# Patient Record
Sex: MALE | Race: WHITE
[De-identification: names, ages, dates, MRNs, and addresses within clinical notes are randomized per-mention and may not be internally consistent; named-entity substitution may affect disease eponyms.]

---

## 2017-07-24 ENCOUNTER — HOSPITAL ENCOUNTER (OUTPATIENT)
Dept: HOSPITAL 18 - NAVSJIPCSP | Age: 72
End: 2017-07-24
Payer: MEDICARE

## 2017-07-24 DIAGNOSIS — R31.0: Primary | ICD-10-CM

## 2017-07-24 LAB
GLUCOSE UR STRIP-MCNC: >=1000 MG/DL
SP GR UR STRIP: 1.02 (ref 1–1.03)
WBC UR QL AUTO: (no result) HPF (ref 0–3)

## 2017-07-24 PROCEDURE — 36415 COLL VENOUS BLD VENIPUNCTURE: CPT

## 2017-07-24 PROCEDURE — 81001 URINALYSIS AUTO W/SCOPE: CPT

## 2017-07-24 PROCEDURE — 87086 URINE CULTURE/COLONY COUNT: CPT

## 2018-01-02 NOTE — RAD
TWO VIEWS CHEST:

 

Comparison: None. 

 

History: Shortness of breath since 11:00 p.m., dyspnea. 

 

FINDINGS: 

Two views of the chest shows a normal sized cardiomediastinal silhouette. The patient is status post 
CABG. Increased interstitial markings are present. There is no evidence of consolidation, mass, pleur
al effusion. Degenerative changes are seen in the spine. 

 

IMPRESSION: 

No evidence of acute cardiopulmonary disease. 

 

POS: C

## 2018-01-02 NOTE — CON
DATE OF CONSULTATION:  2018

 

CARDIOLOGY CONSULTATION

 

REASON FOR CONSULTATION:  Non-STEMI.

 

HISTORY OF PRESENT ILLNESS:  Mr. Grove is a very pleasant 72-year-old white gentleman who comes to [facility] for increased shortness of breath.  He states for last 2 days he has been unable to lay fl
at on his back.  He states he has not been able to sleep at all because he has been coughing every ti
me he lays flat.  He has tried to prop himself up with about 4 or 5 pillows with little improvement, 
so he decided to come in to the hospital for further evaluation.  Here, he was found to have an eleva
milo BNP and was given some IV Lasix, feels much better.  Because of D-dimer was elevated, a CT of the
 chest with contrast was done.  He also had troponins drawn that were elevated, so Cardiology has bee
n consulted for further care.  He has a left bundle branch block on his EKG, so this is at his baseli
ne.  He has significant history of coronary artery disease with previous bypass x5 in .  He had a
 LIMA to the LAD.  He had a nondominant right coronary artery that was not bypassed because it was sm
all.  He has a vein grafts to 4 limbs, two vein grafts are coming off of the aorta and there is Y-gra
ft to OM1, 2, 3 and 4.  One of them is a free radial from the vein graft.  This Y graft was opened an
d the distal portion of the second Y graft was closed on recent heart catheterization in .  He te
lls me that he did not have any chest pain, tightness or pressure in the last few days such as shortn
ess of breath.  When I told him he had a heart attack or the blood work was suggestive of a heart att
ack, he told me this might have happened 2 days ago as he feels that this is when everything started 
to get worse.  He had some heartburn at that time.  Currently, he is pain free, no heartburn.  His br
eathing is much better after his IV Lasix and some diuresis.

 

PAST MEDICAL HISTORY:

1.  PVD.

2.  Type 2 diabetes.

3.  Hypertension.

4.  Hyperlipidemia.

5.  Coronary artery disease.

 

PAST SURGICAL HISTORY:

1.  Coronary bypass grafting x5 in .

2.  Cholecystectomy.

3.  Right salivary gland tumor removal.

4.  Coronary stent in .

5.  Recent peripheral angiography with plans to do aortobifemoral bypass in the future if his symptom
s progress.

 

FAMILY HISTORY:  Father  at 85 of heart disease.  Mother  at 81 of Alzheimer dementia.

 

SOCIAL HISTORY:  He is a former smoker, quit smoking after his heart attack in .  He works at his
 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

 

ALLERGIES:  No known drug allergies.

 

OUTPATIENT MEDICATION:  This is from a note from July of this year and he will bring an updated list,
 they include:

1.  Carvedilol 25 mg b.i.d.

2.  Crestor 10 mg at bedtime.

3.  Plavix 75 mg a day.

4.  Lisinopril 10 mg a day.

5.  Aspirin 325 a day.

6.  Gabapentin 600 mg 3 times a day.

7.  Pramipexole.

8.  Isosorbide mononitrate 30 mg every day.

9.  Glimepiride.

10.  Nitrolingual spray.

 

REVIEW OF SYSTEMS:  A 12-point review of systems was done and is all negative unless stated in the hi
story of present illness.

 

PHYSICAL EXAMINATION:

VITAL SIGNS:  Temperature 97.2, pulse 70, respiratory rate 16, satting 98% on room air, blood pressur
e 120/62.

GENERAL:  Awake, alert, oriented x3, in no distress.

HEENT:  Normocephalic, atraumatic.

NECK:  Supple.

LUNGS:  Have mild crackles at the bilateral bases.

CARDIOVASCULAR:  S1, S2, no S3 or S4.  There is a grade 3/6 systolic ejection murmur at the right upp
er sternal border which is mid to late peaking and it radiated to the rest of the precordium.

ABDOMEN:  Soft, positive bowel sounds.

EXTREMITIES:  No edema.

SKIN:  Warm and dry.

 

LABORATORY WORK:  Reviewed.  White count of 10.9, hemoglobin of 14, hematocrit 45, and white count 13
0.  Coags with D-dimer of 1.86.  Chemistry profile unremarkable except for glucose of 203, total bili
jessica 1.5, AST of 37.  CK of 299.  CK-MB of 7.8, troponin I of 3.9.  BNP of 1057, albumin of 4.2.

 

Chest x-ray was unremarkable.

 

CT of the chest shows findings suggestive of pulmonary edema with bilateral effusions.  There is no e
vidence of pulmonary embolus, but there is an aneurysmal dilatation of the ascending aorta that measu
res 5.6 cm in AP dimension.

 

EKG was reviewed, the left bundle branch block which is old.

 

ASSESSMENT AND PLAN:

1.  Non-ST elevation myocardial infarction.

2.  Acute on chronic systolic heart failure.

3.  Ischemic cardiomyopathy.

4.  Coronary artery disease, severe.

5.  Ascending aortic aneurysm.

6.  Aortic stenosis, moderate per report.

7.  Type 2 diabetes.

8.  Peripheral vascular disease.

 

PLAN:

1.  We will get an echocardiogram to assess his aortic valve.

2.  To continue IV Lasix to diurese.

3.  I spoke with him about further risk stratification with a heart catheterization and he agrees to 
proceed.  I spoke with him at length about the risks and benefits of the procedure and he has had the
m before and he agrees to proceed.  I told him that because of his enlarged aorta, his risk of dissec
tion and perforation is higher, but this is needed before any surgical intervention is planned in the
 aorta.  We will plan on doing an angiography.  I will try to avoid stenting if at all possible as mo
st likely solution would be doing open heart surgery to replace his aortic valve and his aortic aneur
ysm and doing bypass at the same time.  If this were to be the case, he will have to be transferred o
[facility] [place] to [place] for this as we are not able to do this procedure in our facility.

4.  Continue full anticoagulation.  We will reevaluate tomorrow morning and if he is better from his 
breathing standpoint, we will plan on taking him to the catheterization lab at that time.  Currently,
 he is unable to lie flat for longer than 5 or 10 minutes.

 

Thank you for letting us to participate in the care of your patient.  We will follow.

## 2018-01-02 NOTE — CT
CT ANGIO OF CHEST PERFORMED WITH INTRAVENOUS CONTRAST ENHANCEMENT AND 3D RECONSTRUCTIONS:

 

History: Shortness of breath. 

 

FINDINGS: 

Lungs show some mild ground glass opacity with moderate bilateral pleural effusions, right larger cr
n left. This suggests an element of edema. 

 

Thoracic aorta shows aneurysmal dilatation of the ascending aorta which measures approximately 5.6 cm
. The descending thoracic aorta is within normal limits. 

 

There is good pulmonary artery opacification. Artifact degrades detailed evaluation of the subsegment
al branches in the lower lobes for evaluation for pulmonary embolus. I see no definitive signs of pul
monary embolus. 

 

Visualized liver parenchyma shows no focal abnormalities. 

 

IMPRESSION: 

1. Findings that suggest an element of edema, bilateral effusions, and some mild ground glass opacity
 to both lung fields. 

2. No CT evidence for pulmonary embolus. 

3. Aneurysmal dilatation of the ascending aorta which measures approximately 5.6 cm in AP dimension.

 

POS: [facility]

## 2018-01-02 NOTE — HP
CHIEF COMPLAINT:  Shortness of breath.

 

HISTORY OF PRESENT ILLNESS:  This is a 72-year-old pleasant gentleman with a history of coronary satnam
ry disease status post CABG, comes into the hospital with shortness of breath.  The patient says that
 he was out on the [place] in the night hunting.  He had hunted a deer and once he had process
ed the animal, he became extremely short of breath.  It did not resolve on the first and hence he cam
e to the hospital for further evaluation and treatment.  Initial evaluation showed BNP to be 1057 and
 troponin of 3.9.  Dr. Olvera was consulted.  He recommended admission of the patient for further justice
luation of the coronary arteries and elevated troponin and management of CHF.  The patient denies any
 fever, chills, nausea, vomiting, diarrhea or dysuria.  No chest pain at current moment.

 

PAST MEDICAL HISTORY:  Significant for coronary artery disease status post CABG, history of CHF, diab
etes, hypertension, and hyperlipidemia.

 

PAST SURGICAL HISTORY:  Significant for cardiac stenting x1, CABG and cholecystectomy.

 

PSYCHIATRIC HISTORY:  No previous psychiatric history.

 

SOCIAL HISTORY:  Does not drink, smoke or do recreational drugs.

 

FAMILY HISTORY:  Negative for diabetes and hypertension.

 

ALLERGIES:  No known drug allergies.

 

MEDICATIONS:  Please see MAR.

 

REVIEW OF SYSTEMS:  Significant for shortness of breath, otherwise no fever, no chills, no headache, 
no appetite, no hearing loss, no latencies.  No cough, no chest pain, no diarrhea, no dysuria, no alka
yuria, no memory or mood changes, and no neck pain.

 

PHYSICAL EXAMINATION:

VITAL SIGNS:  Blood pressure 112/63, pulse is 73, respirations 23, temperature afebrile.

GENERAL:  Patient is lying in bed, no apparent distress.

HEENT:  Atraumatic and normocephalic.  Pupils equally round, react to light.  Extraocular movements i
ntact.  Mucous membranes moist.

NECK:  No JVD.

CHEST:  Bibasilar rales heard.

HEART:  S1, S2 normal.  No murmurs or gallops.

ABDOMEN:  Soft, obese.

EXTREMITIES:  No cyanosis, clubbing, edema.  Distal pulses present.

NEUROLOGICAL:  Alert, awake, oriented.  No cranial deficits.  No sensorimotor deficits.

 

IMAGING DATA AND LABORATORY DATA:  CTA shows edema, no embolism.  Chest x-ray normal.  Also, CTA show
ed dilatation of the ascending aorta 5.6 cm.  BNP is 1057.  Chest x-ray negative.  CK-MB is 7.8, CK 2
99, potassium is 4.4, creatinine is 0.9, bilirubin 1.5.  WBC 7.9, hemoglobin is 14.

 

ASSESSMENT AND PLAN:

1.  Non-ST-segment elevation myocardial infarction.  We will trend troponins.  Echocardiogram, ACS pr
otocol.  Dr. May cruz will follow his recommendations.

2.  Congestive heart failure exacerbation.  We will do echocardiogram to classify the congestive hear
t failure.  We will treat with IV Lasix for now.  For ACS protocol includes, we will treat with subcu
 Lovenox therapeutic dose.

3.  Diabetes mellitus, put him on insulin sliding scale.

4.  Hyperlipidemia.  We will check fasting lipid profile.

5.  Hypertension appears to be stable.

6.  Obesity.  The patient has been counseled.  Sequential compression devices for deep venous thrombo
sis prophylaxis.  Patient also is on Lovenox.  The patient appears to be a FULL CODE as of now.  We w
ill work with Cardiology and further caring for the patient.

## 2018-01-03 NOTE — DIS
DATE OF ADMISSION:  01/02/2018

 

DATE OF DISCHARGE:  01/03/2018

 

DISPOSITION:  Different inpatient hospital.

 

DIAGNOSES ON DISCHARGE:  Non-ST elevation myocardial infarction, acute on chronic systolic congestive
 heart failure, ischemic cardiomyopathy, coronary artery disease, ascending aortic aneurysm, diabetes
, peripheral vascular disease, hyperlipidemia, diabetes, and hypertension.

 

CONSULTANTS:  Dr. Olvera.

 

DISCHARGE MEDICATIONS:  The same as admit medications.

 

BRIEF HOSPITAL COURSE:  A 72-year-old pleasant gentleman came into the hospital with shortness of ronald
ath after an episode of hunting, please refer to the admitting physician's H&P for further details.  
The patient was admitted, given Lovenox.  Dr. Olvera spoke to the patient in detail about his conditi
on, they decided to have further care with their cardiologist, who is at the [facility].  Arrangements for t
he transfer are being made right now, patient is medically stable to be transferred.

 

PHYSICAL EXAMINATION:

VITAL SIGNS:  At the time of discharge, his vital signs were stable.  Blood pressure was 100/60, puls
e was 70, respirations 20, and temperature afebrile.

GENERAL:  The patient was lying in bed, no apparent distress.

HEENT:  Atraumatic and normocephalic.  Pupils equally round, reactive to light.  Extraocular movement
s intact.  Mucous membranes moist.

NECK:  Supple.  No JVD.

CHEST:  Breath sounds heard.  There are no rales or rhonchi.

HEART:  S1 and S2, no murmurs or gallops.

ABDOMEN:  Soft.

EXTREMITIES:  No cyanosis, clubbing or edema.  Distal pulses present.

NEUROLOGIC:  Alert, awake, oriented.  No cranial deficits.  No sensorimotor deficits.

 

The patient right now is medically stable to be transferred.  In our hospital, he had a CTA, which sh
owed no embolism.  Troponins were elevated this hospital stay.  He is right now medically stable to b
e transferred, to be accepted by his cardiologist.  He is asked to come back to the emergency room in
 case symptoms recur.

 

Total time for this discharge took 35 minutes.

## 2018-01-03 NOTE — PDOC.CTH
Cardiology Progress Note





- Subjective





He has diuresed well and his breathing is much better. 





- Objective


 Vital Signs











  Temp Pulse Resp BP BP Pulse Ox


 


 01/03/18 07:57  98.0 F  67  18   138/66  95


 


 01/03/18 04:00  98.1 F  76  20  122/79   95








 











Admit Weight                   224 lb


 


Weight                         223 lb














 











 01/02/18 01/03/18 01/04/18





 06:59 06:59 06:59


 


Intake Total  240 


 


Balance  240 














- Physical Examination


General/Neuro: alert & oriented x3, NAD


Neck: no JVD present


Lungs: CTA, unlabored respirations


Heart: RRR


Abdomen: NT/ND


Extremities: + edema B (trace.)





- Telemetry


Telemetry Rhythm: NSR





- Labs


Result Diagrams: 


 01/02/18 05:33





 01/03/18 04:47


 Troponin/CKMB











CK-MB (CK-2)  7.8 ng/mL (0-6.6)  H*  01/02/18  05:33    


 


Troponin I  4.839 ng/mL (< 0.028)  H*  01/02/18  22:45    














- Assessment/Plan





1. Acute on chronic systolic heart failure


2. Severe dilated CM. EF at 20-25%


3. Severe aortic stenosis. 


4. Ascending aortic artery aneurysm, measured at 5.6 cm on CT


5. NSTEMI








PLAN:


- I spoke with his primary Cardiologist Julius Nayak and he tells me his coronary 

disease is severe and likely to be amenable to any intervention. 


- Most likely cause of his CHF exacerbation is worsening of his aortic valve, 

per Dr. Madrid his valve was moderate to severe with normal EF on echo on 9/ 2016.


- With reduced EF and also needing an aortic root he would require open heart 

surgery for a root repair, valve replacement and possibly bypass surgery, he 

would be high risk. I do not think he is a candidate for a TAVR with an 

enlarged root. medical therapy may be his best option and is what will do for 

now. 


- He would like to be transferred to the [facility] as there is were his 

primary Cardiologist is. if his transfer does not go through he will be treated 

medically with 48hrs of anticoagulation and will be discharged home probably 

tomorrow on a lifevest. Order will be placed today.

## 2018-09-17 ENCOUNTER — APPOINTMENT (RX ONLY)
Dept: URBAN - METROPOLITAN AREA CLINIC 91 | Facility: CLINIC | Age: 73
Setting detail: DERMATOLOGY
End: 2018-09-17

## 2018-09-17 DIAGNOSIS — Z85.828 PERSONAL HISTORY OF OTHER MALIGNANT NEOPLASM OF SKIN: ICD-10-CM

## 2018-09-17 DIAGNOSIS — D22 MELANOCYTIC NEVI: ICD-10-CM

## 2018-09-17 DIAGNOSIS — L57.0 ACTINIC KERATOSIS: ICD-10-CM

## 2018-09-17 PROBLEM — H91.90 UNSPECIFIED HEARING LOSS, UNSPECIFIED EAR: Status: ACTIVE | Noted: 2018-09-17

## 2018-09-17 PROBLEM — L85.3 XEROSIS CUTIS: Status: ACTIVE | Noted: 2018-09-17

## 2018-09-17 PROBLEM — D48.5 NEOPLASM OF UNCERTAIN BEHAVIOR OF SKIN: Status: ACTIVE | Noted: 2018-09-17

## 2018-09-17 PROBLEM — I25.10 ATHEROSCLEROTIC HEART DISEASE OF NATIVE CORONARY ARTERY WITHOUT ANGINA PECTORIS: Status: ACTIVE | Noted: 2018-09-17

## 2018-09-17 PROBLEM — I10 ESSENTIAL (PRIMARY) HYPERTENSION: Status: ACTIVE | Noted: 2018-09-17

## 2018-09-17 PROBLEM — E13.9 OTHER SPECIFIED DIABETES MELLITUS WITHOUT COMPLICATIONS: Status: ACTIVE | Noted: 2018-09-17

## 2018-09-17 PROCEDURE — 17000 DESTRUCT PREMALG LESION: CPT

## 2018-09-17 PROCEDURE — 17003 DESTRUCT PREMALG LES 2-14: CPT

## 2018-09-17 PROCEDURE — ? COUNSELING

## 2018-09-17 PROCEDURE — ? BIOPSY BY SHAVE METHOD

## 2018-09-17 PROCEDURE — 11100: CPT | Mod: 59

## 2018-09-17 PROCEDURE — ? LIQUID NITROGEN

## 2018-09-17 PROCEDURE — 99213 OFFICE O/P EST LOW 20 MIN: CPT | Mod: 25

## 2018-09-17 ASSESSMENT — LOCATION SIMPLE DESCRIPTION DERM
LOCATION SIMPLE: RIGHT CHEEK
LOCATION SIMPLE: LEFT FOREARM
LOCATION SIMPLE: RIGHT FOREARM

## 2018-09-17 ASSESSMENT — LOCATION DETAILED DESCRIPTION DERM
LOCATION DETAILED: LEFT PROXIMAL RADIAL DORSAL FOREARM
LOCATION DETAILED: RIGHT CENTRAL MALAR CHEEK
LOCATION DETAILED: RIGHT DISTAL DORSAL FOREARM

## 2018-09-17 ASSESSMENT — PAIN INTENSITY VAS: HOW INTENSE IS YOUR PAIN 0 BEING NO PAIN, 10 BEING THE MOST SEVERE PAIN POSSIBLE?: NO PAIN

## 2018-09-17 ASSESSMENT — LOCATION ZONE DERM
LOCATION ZONE: ARM
LOCATION ZONE: FACE

## 2018-09-17 NOTE — PROCEDURE: BIOPSY BY SHAVE METHOD
Consent: Verbal consent was obtained and risks were reviewed including but not limited to scarring, infection, bleeding, scabbing, incomplete removal, nerve damage and allergy to anesthesia.
Bill 97419 For Specimen Handling/Conveyance To Laboratory?: no
Detail Level: Detailed
Wound Care: Vaseline
Was A Bandage Applied: Yes
Size Of Lesion In Cm: 0
Cryotherapy Text: The wound bed was treated with cryotherapy after the biopsy was performed.
Type Of Destruction Used: Curettage
Biopsy Method: Dermablade
Lab: [id number]
Silver Nitrate Text: The wound bed was treated with silver nitrate after the biopsy was performed.
Hemostasis: Robert's
Biopsy Type: H and E
Notification Instructions: Patient will be notified of biopsy results. However, patient instructed to call the office if not contacted within 2 weeks.
Curettage Text: The wound bed was treated with curettage after the biopsy was performed.
Electrodesiccation And Curettage Text: The wound bed was treated with electrodesiccation and curettage after the biopsy was performed.
Post-Care Instructions: I reviewed with the patient in detail post-care instructions. Patient is to keep the biopsy site dry overnight, and then apply vaseline twice daily until healed. Patient may apply hydrogen peroxide soaks to remove any crusting.
Depth Of Biopsy: dermis
Electrodesiccation Text: The wound bed was treated with electrodesiccation after the biopsy was performed.
Dressing: bandage
Lab Facility: [id number]
Anesthesia Type: 1% lidocaine without epinephrine
Billing Type: Third-Party Bill
Anesthesia Volume In Cc (Will Not Render If 0): 0.3

## 2018-09-17 NOTE — PROCEDURE: LIQUID NITROGEN
Post-Care Instructions: I reviewed with the patient in detail post-care instructions. Patient is to wear sunprotection, and avoid picking at any of the treated lesions. Pt may apply Vaseline to crusted or scabbing areas. If any treated lesion does not resolve patient is asked to return for further treatment and/or biopsy.
Detail Level: Simple
Number Of Freeze-Thaw Cycles: 1 freeze-thaw cycle
Duration Of Freeze Thaw-Cycle (Seconds): 5
Render Post-Care Instructions In Note?: no
Consent: The patient's verbal consent was obtained including but not limited to risks of crusting, scabbing, blistering, scarring, darker or lighter pigmentary change, recurrence, incomplete removal and infection.

## 2020-02-17 ENCOUNTER — HOSPITAL ENCOUNTER (OUTPATIENT)
Dept: HOSPITAL 18 - NAV RAD | Age: 75
Discharge: HOME | End: 2020-02-17
Attending: INTERNAL MEDICINE
Payer: MEDICARE

## 2020-02-17 DIAGNOSIS — M19.011: ICD-10-CM

## 2020-02-17 DIAGNOSIS — S40.011A: Primary | ICD-10-CM

## 2020-02-17 NOTE — RAD
Right shoulder 3 views



HISTORY: Right shoulder pain.



FINDINGS: Acromioclavicular and glenohumeral alignment are maintained. Mild osteophytosis. Downslopin
g of the acromion. No acute fracture, dislocation, or aggressive osseous erosions.











IMPRESSION: Mild osteoarthritic changes right shoulder.



Reported By: TAMMY Rubio 

Electronically Signed:  2/17/2020 4:26 PM

## 2020-05-27 ENCOUNTER — HOSPITAL ENCOUNTER (EMERGENCY)
Dept: HOSPITAL 18 - NAV ERS | Age: 75
Discharge: HOME | End: 2020-05-27
Payer: MEDICARE

## 2020-05-27 DIAGNOSIS — Z79.899: ICD-10-CM
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
## 2020-05-27 DIAGNOSIS — E78.5: ICD-10-CM

## 2020-05-27 DIAGNOSIS — I11.0: ICD-10-CM

## 2020-05-27 DIAGNOSIS — E78.00: ICD-10-CM

## 2020-05-27 DIAGNOSIS — Z79.82: ICD-10-CM

## 2020-05-27 DIAGNOSIS — I50.9: ICD-10-CM

## 2020-05-27 DIAGNOSIS — S83.401A: Primary | ICD-10-CM

## 2020-05-27 NOTE — RAD
Exam:4 views right knee



HISTORY: Pain.



COMPARISON: None



FINDINGS: Moderate atherosclerosis. No fracture, cortical irregularity or periosteal reaction. Preser
william joint spaces. No joint effusion.



IMPRESSION: No acute abnormality.



Reported By: Christine Thompson 

Electronically Signed:  5/27/2020 2:12 PM

## 2020-10-05 ENCOUNTER — HOSPITAL ENCOUNTER (INPATIENT)
Dept: HOSPITAL 92 - ERS | Age: 75
LOS: 4 days | Discharge: HOME | DRG: 253 | End: 2020-10-09
Attending: INTERNAL MEDICINE | Admitting: INTERNAL MEDICINE
Payer: MEDICARE

## 2020-10-05 VITALS — BODY MASS INDEX: 26.6 KG/M2

## 2020-10-05 DIAGNOSIS — L03.116: ICD-10-CM

## 2020-10-05 DIAGNOSIS — E11.42: ICD-10-CM

## 2020-10-05 DIAGNOSIS — I11.0: ICD-10-CM

## 2020-10-05 DIAGNOSIS — E78.00: ICD-10-CM

## 2020-10-05 DIAGNOSIS — I77.1: ICD-10-CM

## 2020-10-05 DIAGNOSIS — Z95.1: ICD-10-CM

## 2020-10-05 DIAGNOSIS — E11.52: Primary | ICD-10-CM

## 2020-10-05 DIAGNOSIS — I50.9: ICD-10-CM

## 2020-10-05 DIAGNOSIS — L97.429: ICD-10-CM

## 2020-10-05 DIAGNOSIS — Z95.4: ICD-10-CM

## 2020-10-05 DIAGNOSIS — Z79.899: ICD-10-CM

## 2020-10-05 DIAGNOSIS — Z20.828: ICD-10-CM

## 2020-10-05 DIAGNOSIS — I25.10: ICD-10-CM

## 2020-10-05 DIAGNOSIS — Z79.82: ICD-10-CM

## 2020-10-05 DIAGNOSIS — Z90.49: ICD-10-CM

## 2020-10-05 DIAGNOSIS — N40.0: ICD-10-CM

## 2020-10-05 DIAGNOSIS — I73.9: ICD-10-CM

## 2020-10-05 DIAGNOSIS — G25.81: ICD-10-CM

## 2020-10-05 PROCEDURE — 86901 BLOOD TYPING SEROLOGIC RH(D): CPT

## 2020-10-05 PROCEDURE — A9579 GAD-BASE MR CONTRAST NOS,1ML: HCPCS

## 2020-10-05 PROCEDURE — 74185 MRA ABD W OR W/O CNTRST: CPT

## 2020-10-05 PROCEDURE — 83605 ASSAY OF LACTIC ACID: CPT

## 2020-10-05 PROCEDURE — 80202 ASSAY OF VANCOMYCIN: CPT

## 2020-10-05 PROCEDURE — 83735 ASSAY OF MAGNESIUM: CPT

## 2020-10-05 PROCEDURE — 86900 BLOOD TYPING SEROLOGIC ABO: CPT

## 2020-10-05 PROCEDURE — 86850 RBC ANTIBODY SCREEN: CPT

## 2020-10-05 PROCEDURE — 87635 SARS-COV-2 COVID-19 AMP PRB: CPT

## 2020-10-05 PROCEDURE — 86769 SARS-COV-2 COVID-19 ANTIBODY: CPT

## 2020-10-05 PROCEDURE — 75635 CT ANGIO ABDOMINAL ARTERIES: CPT

## 2020-10-05 PROCEDURE — 36416 COLLJ CAPILLARY BLOOD SPEC: CPT

## 2020-10-05 PROCEDURE — 93923 UPR/LXTR ART STDY 3+ LVLS: CPT

## 2020-10-05 PROCEDURE — C8900 MRA W/CONT, ABD: HCPCS

## 2020-10-05 PROCEDURE — 36415 COLL VENOUS BLD VENIPUNCTURE: CPT

## 2020-10-05 PROCEDURE — C8912 MRA W/CONT, LWR EXT: HCPCS

## 2020-10-05 PROCEDURE — U0003 INFECTIOUS AGENT DETECTION BY NUCLEIC ACID (DNA OR RNA); SEVERE ACUTE RESPIRATORY SYNDROME CORONAVIRUS 2 (SARS-COV-2) (CORONAVIRUS DISEASE [COVID-19]), AMPLIFIED PROBE TECHNIQUE, MAKING USE OF HIGH THROUGHPUT TECHNOLOGIES AS DESCRIBED BY CMS-2020-01-R: HCPCS

## 2020-10-05 PROCEDURE — 83880 ASSAY OF NATRIURETIC PEPTIDE: CPT

## 2020-10-05 PROCEDURE — 85025 COMPLETE CBC W/AUTO DIFF WBC: CPT

## 2020-10-05 PROCEDURE — 80048 BASIC METABOLIC PNL TOTAL CA: CPT

## 2020-10-05 RX ADMIN — VANCOMYCIN SCH MLS: 1.5 INJECTION, SOLUTION INTRAVENOUS at 20:55

## 2020-10-05 NOTE — HP
REASON FOR ADMISSION:  Left foot and leg cellulitis, nonhealing ulcer.



HISTORY OF PRESENTING ILLNESS:  The patient gives history of having a spider 
bite

and he specifically mentions that it was brown recluse on .  It

apparently bit him three times on the same day.  This happened on the medial 
aspect

of his left leg.  The area got red and black and he had initial bout of 
cellulitis,

which is more in the leg area.  He took two weeks of antibiotics then and went 
back

to see his primary care physician.  After about four weeks or so, the redness 
moved

to the ankle area and he developed a blister over the plantar aspect of left 
great

toe and the heel area.  He went back to his primary care physician again and got

ciprofloxacin and doxycycline.  The patient says this has been helping him, but 
his

primary care physician was getting worried as he has required nearly three 
rounds of

antibiotics and his wounds are not healing and asked him to get hospitalized for

further workup.  He had arterial Doppler done, which showed high velocities in 
both

lower extremities.  Currently, has no complaints of any pain.  He is ambulating 
on

his legs.  No complaints of fever as of now.  The patient does not have any 
COVID-19

symptoms. 



PAST MEDICAL AND SURGICAL HISTORY:  History of CABG for five-vessel disease done
by

Dr. Osman.  He has had prior stents done.  History of CHF/cardiomyopathy, aortic

valve replacement, which is bovine, diabetes mellitus type 2, hypertension,

dyslipidemia, benign prostatic hypertrophy, restless legs syndrome, peripheral

neuropathy, and cholecystectomy.  Echo done in 2018 shows ejection

fraction of 20% to 25%. 



CURRENT MEDICATIONS:  

1. The patient is on pramipexole 0.25 mg p.o. twice daily.

2. Gabapentin 600 mg daily and 1200 mg p.o. nightly.

3. Finasteride 5 mg p.o. daily.

4. Entresto 24/26 mg one tablet twice daily.

5. Glimepiride 2 mg twice daily.

6. Lasix 20 mg daily.

7. Plavix 75 mg daily.

8. Rosuvastatin 10 mg p.o. every evening.

9. Vitamin C 1000 mg p.o. daily.

10. Zinc sulfate 220 mg p.o. daily.



ALLERGIES:  NO KNOWN DRUG ALLERGIES.



PERSONAL HISTORY:  Drinks one beer around 4 p.m.  Otherwise, does not abuse 
heavy

alcohol or drugs.  Does not smoke now.  He stays alone.  His wife  of COVID-
19

on .  He has always tested negative for it. 



FAMILY HISTORY:  Mother  of dementia and its complications at the age of 75.

Father  of natural causes at the age of 86. 



CODE STATUS:  Full.  Power of  is his son, Mr. Trace Hastings.



REVIEW OF SYSTEMS:  CONSTITUTIONAL:  Negative for weight loss or gain, ability 
to

conduct usual activities. 

SKIN:  Negative for rash, itching. 

EYES:  Negative for double vision, pain. 

ENT/MOUTH:  Negative for nose bleeding, neck stiffness, pain, tenderness. 

CARDIOVASCULAR:  Negative for palpitations, dyspnea on exertion, orthopnea. 

RESPIRATORY:  Negative for shortness of breath, wheezing, cough, hemoptysis, 
fever

or night sweats. 

GASTROINTESTINAL:  Negative for poor appetite, abdominal pain, heartburn, 
nausea,

vomiting, constipation, or diarrhea. 

GENITOURINARY:  Negative for urgency, frequency, dysuria, nocturia. 

MUSCULOSKELETAL:  Negative for pain, swelling. 

NEUROLOGIC/PSYCHIATRIC:  Negative for anxiety, depression. 

ALLERGY/IMMUNOLOGIC:  Negative for skin rash, bleeding tendency.



PHYSICAL EXAMINATION:

GENERAL:  The patient is a 75-year-old male, who is currently not in any acute

distress. 

VITAL SIGNS:  Blood pressure 146/66, pulse rate 56 per minute, respiratory rate 
16

per minute, temperature 98.5 degrees Fahrenheit, and saturating 96% on room air.


NECK:  Supple.  No elevated JVD. 

HEENT:  Eyes, extraocular muscles are intact.  Pupils are reacting to light.  
Oral

cavity, mucous membranes are moist.  No exudates or congestion. 

CARDIOVASCULAR SYSTEM:  S1 and S2 heard.  Regular rhythm. 

RESPIRATORY SYSTEM:  Air entry 1+ bilateral.  No rales or rhonchi. 

ABDOMEN:  Soft.  Bowel sounds heard.  No tenderness, rigidity, or guarding. 

EXTREMITIES:  Left foot, ankle, and lower 1/3 of his leg is erythematous.  He 
has

old healing ulcer over the plantar aspect of distal phalanx of left great toe.  
He

also has a healing ulcer over the left heel.  Peripheral pulses are 1+ 
bilateral. 

CENTRAL NERVOUS SYSTEM:  No gross focal motor deficits noted.  The patient is 
alert,

awake, and oriented well. 

PSYCHIATRIC SYSTEM:  The patient's mood is euthymic.  No hallucinations or 
delusions.



LABORATORY DATA:  White count of 7, H and H 15 and 50, platelet count 131, MCV 
is 98

with 73% neutrophils.  Electrolytes stable.  BUN 22, creatinine 0.8, serum 
glucose

153.  Ultrasound arterial Doppler on both lower extremities done showed 
extensive

atherosclerotic disease throughout all major arteries of bilateral lower

extremities.  There is a high-grade arterial occlusive disease throughout 
bilateral

lower extremities.  Left tibia and fibula, two view x-ray done showed no 
fracture or

dislocation.  Scattered soft tissue swelling over the pretibial area. 



CLINICAL IMPRESSION AND PLAN:  The patient will be admitted to medical floor for

left lower extremity cellulitis with nonhealing ulcers and likely peripheral

vascular disease.  He has had a prior abdominal aortogram with runoff done by 
Dr. Osman in 2018.  We will consult Dr. Osman for Vascular Surgery and will place 
him on

vancomycin and ceftriaxone for now.  We will continue Plavix, glimepiride, 
Proscar,

Crestor, Mirapex, Entresto at home doses.  COVID-19 PCR for completion be 
obtained.

We will continue to closely monitor him on medical floor. 







Job ID:  [id number]



[facility]

## 2020-10-05 NOTE — ULT
ULTRASOUND DOPPLER DUPLEX ARTERIA BILATERAL:



DATE:

10/5/2020



HISTORY:

75-year-old male with bilateral lower extremity decreased pulses



TECHNIQUE:

Grayscale, color-flow, and spectral analysis, of major arteries of bilateral lower extremities.



FINDINGS:



Atherosclerosis:

Calcified plaque visualized throughout all arteries.



Highest peak systolic velocity in cm/s, followed by pulsed Doppler waveform:



RIGHT:



Common femoral: 95; Biphasic

Profunda femoral: 60; Biphasic

Superficial femoral, proximal: 30; Biphasic

Superficial femoral, mid: 15; Monophasic

Superficial femoral, distal: Flow not visualized

Popliteal: 15; Monophasic

Posterior tibial: 20; Monophasic

Anterior tibial: 5; Monophasic

Dorsalis pedis: 5; Monophasic



LEFT:



Common femoral: 85; Biphasic

Profunda femoral: 10; Monophasic

Superficial femoral, proximal: 10; Monophasic

Superficial femoral, mid: 10; Monophasic

Superficial femoral, distal: 10; Monophasic

Popliteal: 10; Monophasic

Posterior tibial: Flow not visualized

Anterior tibial: 20; Monophasic

Dorsalis pedis: Flow not visualized



IMPRESSION:

1 extensive atherosclerotic disease throughout all major arteries of bilateral lower extremities.

2. High-grade arterial occlusive disease throughout bilateral lower extremities.



Reported By: Ren Starr 

Electronically Signed:  10/5/2020 2:50 PM

## 2020-10-05 NOTE — CT
HISTORY: Peripheral vascular disease



COMPARISON: 7/27/2017



TECHNIQUE: Multiple contiguous axial images were obtained a CTA of the abdomen, pelvis, and bilateral
 lower extremities with contrast. Sagittal and coronal 3-D MIP reformats were performed.



FINDINGS:

Liver: Diffuse hypoattenuation suggesting hepatic steatosis.

Gallbladder: Surgically absent.

Kidneys: Symmetric enhancement. No evidence of obstructive uropathy.

Adrenal glands: Unremarkable.

Spleen: Unremarkable.

Pancreas: Unremarkable.



Bowel: Limited evaluation by the lack of oral contrast. No evidence of a bowel obstruction. Normal il
eocecal junction. Normal caliber appendix. Diverticulosis. Colitis..

Reproductive organs :Enlarged prostate gland. There are calcifications present.

Retroperitoneum: No lymphadenopathy

Bones: Multilevel vacuum disc phenomenon. Multilevel degenerative changes.

Inferior thorax: Chronic changes.

Heart: Normal heart size. There is a stent at the proximal ascending thoracic aorta.



Abdominal aorta. Normal caliber without evidence of dissection or aneurysmal dilatation.

Celiac trunk: Patent

SMA: Patent

MELINDA: Patent

Renal arteries: Left single renal arteries and 2 right renal arteries without significant atheroscler
otic disease



Bilateral common iliac arteries: Unremarkable.

Internal iliac arteries: Atherosclerosis involving proximal bilateral internal iliac arteries..

External iliac arteries: Unremarkable.



Common femoral arteries: Unremarkable.

Profunda femoral arteries: Unremarkable.

Superficial femoral arteries: Atherosclerosis with occlusion of the proximal right superficial femora
l artery, mid and distal superficial femoral artery. Atherosclerosis with occlusion of the mid and

distal left superficial femoral artery..



Popliteal arteries: Atherosclerosis and occlusion of bilateral popliteal arteries..

Right lower extremity: Suboptimal evaluation due to poor timing of contrast bolus. There is evidence 
of atherosclerosis.

Left lower extremity: Suboptimal evaluation due to poor timing of contrast bolus. There is extensive 
atherosclerotic



IMPRESSION:



1. Suboptimal evaluation of the left and right lower extremity arterial system due to poor timing of 
contrast bolus.

2. There does appear to be significant atherosclerosis and occlusion involving bilateral superficial 
femoral arteries and popliteal arteries. Evaluation may in part be limited due to poor timing of

contrast bolus. Consider conventional angiography.

3. Additional findings as above



Transcribed Date/Time: 10/5/2020 8:46 PM



Reported By: Christine Thompson 

Electronically Signed:  10/5/2020 8:50 PM

## 2020-10-06 LAB
ANION GAP SERPL CALC-SCNC: 11 MMOL/L (ref 10–20)
BASOPHILS # BLD AUTO: 0 THOU/UL (ref 0–0.2)
BASOPHILS NFR BLD AUTO: 0.4 % (ref 0–1)
BUN SERPL-MCNC: 19 MG/DL (ref 8.4–25.7)
CALCIUM SERPL-MCNC: 8.8 MG/DL (ref 7.8–10.44)
CHLORIDE SERPL-SCNC: 104 MMOL/L (ref 98–107)
CO2 SERPL-SCNC: 25 MMOL/L (ref 23–31)
CREAT CL PREDICTED SERPL C-G-VRATE: 113 ML/MIN (ref 70–130)
EOSINOPHIL # BLD AUTO: 0.2 THOU/UL (ref 0–0.7)
EOSINOPHIL NFR BLD AUTO: 2.6 % (ref 0–10)
GLUCOSE SERPL-MCNC: 80 MG/DL (ref 83–110)
HGB BLD-MCNC: 16 G/DL (ref 14–18)
LYMPHOCYTES # BLD: 1.6 THOU/UL (ref 1.2–3.4)
LYMPHOCYTES NFR BLD AUTO: 27.5 % (ref 21–51)
MCH RBC QN AUTO: 32.6 PG (ref 27–31)
MCV RBC AUTO: 98.6 FL (ref 78–98)
MONOCYTES # BLD AUTO: 0.7 THOU/UL (ref 0.11–0.59)
MONOCYTES NFR BLD AUTO: 11.5 % (ref 0–10)
NEUTROPHILS # BLD AUTO: 3.4 THOU/UL (ref 1.4–6.5)
NEUTROPHILS NFR BLD AUTO: 58 % (ref 42–75)
PLATELET # BLD AUTO: 122 THOU/UL (ref 130–400)
POTASSIUM SERPL-SCNC: 4.2 MMOL/L (ref 3.5–5.1)
RBC # BLD AUTO: 4.89 MILL/UL (ref 4.7–6.1)
SODIUM SERPL-SCNC: 136 MMOL/L (ref 136–145)
WBC # BLD AUTO: 5.8 THOU/UL (ref 4.8–10.8)

## 2020-10-06 RX ADMIN — VANCOMYCIN SCH MLS: 1.5 INJECTION, SOLUTION INTRAVENOUS at 20:42

## 2020-10-06 RX ADMIN — INSULIN LISPRO PRN UNIT: 100 INJECTION, SOLUTION INTRAVENOUS; SUBCUTANEOUS at 18:21

## 2020-10-06 RX ADMIN — INSULIN LISPRO PRN UNIT: 100 INJECTION, SOLUTION INTRAVENOUS; SUBCUTANEOUS at 11:27

## 2020-10-06 RX ADMIN — CEFTRIAXONE SCH MLS: 2 INJECTION, POWDER, FOR SOLUTION INTRAMUSCULAR; INTRAVENOUS at 11:18

## 2020-10-06 RX ADMIN — VANCOMYCIN SCH MLS: 1.5 INJECTION, SOLUTION INTRAVENOUS at 08:18

## 2020-10-06 NOTE — PDOC.HOSPP
- Subjective


Encounter Date: 10/06/20


Encounter Time: 09:00


Subjective: 


no pain in his leg


feels better, no sob





- Objective


Vital Signs & Weight: 


                             Vital Signs (12 hours)











  Temp Pulse Resp BP Pulse Ox


 


 10/06/20 07:24  98.0 F  62  20  137/70  94 L








                                     Weight











Weight                         213 lb














I&O: 


                                        











 10/05/20 10/06/20 10/07/20





 06:59 06:59 06:59


 


Intake Total  240 


 


Balance  240 











Result Diagrams: 


                                 10/06/20 05:27





                                 10/06/20 05:27


Additional Labs: 


                                   Accuchecks











  10/06/20





  11:12


 


POC Glucose  152 H














Hospitalist ROS





- Medication


Medications: 


Active Medications











Generic Name Dose Route Start Last Admin





  Trade Name Freq  PRN Reason Stop Dose Admin


 


Enoxaparin Sodium  40 mg  10/06/20 09:00  10/06/20 09:39





  Enoxaparin Sodium 40 Mg/0.4 Ml Syringe  SC   Not Given





  0900 CHON  


 


Famotidine  20 mg  10/05/20 21:00  10/06/20 08:19





  Famotidine 20 Mg Tab  PO   20 mg





  BID CHON   Administration


 


Finasteride  5 mg  10/06/20 09:00  10/06/20 08:19





  Finasteride 5 Mg Tab  PO   5 mg





  DAILY CHON   Administration


 


Gabapentin  600 mg  10/06/20 09:00  10/06/20 08:19





  Gabapentin 300 Mg Cap  PO   600 mg





  DAILY CHON   Administration


 


Gabapentin  1,200 mg  10/05/20 21:00  10/05/20 21:01





  Gabapentin 400 Mg Cap  PO   Not Given





  HS CHON  


 


Glimepiride  2 mg  10/05/20 16:30  10/06/20 08:18





  Glimepiride 2 Mg Tab  PO   2 mg





  BID-AC CHON   Administration


 


Vancomycin HCl 1.5 gm/ Device  300 mls @ 200 mls/hr  10/05/20 20:00  10/06/20 

08:18





  IVPB   300 mls





  0800,2000 CHON   Administration


 


Ceftriaxone Sodium 2 gm/  100 mls @ 200 mls/hr  10/06/20 10:00  10/06/20 11:18





  Sodium Chloride  IVPB   100 mls





  1000 CHON   Administration


 


Insulin Human Lispro  0 units  10/05/20 16:16  10/06/20 11:27





  Humalog 300 Units/3 Ml Vial  SC   2 unit





  .MODERATE SLIDING SC PRN   Administration





  Moderate Correctional Scale  


 


Pramipexole Dihydrochloride  0.25 mg  10/05/20 21:00  10/06/20 08:20





  Pramipexole Di-Hcl 0.25 Mg Tab  PO   0.25 mg





  BID CHON   Administration


 


Rosuvastatin Calcium  10 mg  10/05/20 21:00  10/05/20 21:01





  Rosuvastatin 10 Mg Tab  PO   Not Given





  QPM CHON  


 


Sacubitril/Valsartan  1 tab  10/05/20 21:00  10/06/20 08:20





  Sacubitril 24mg/Valsartan 26mg Tab  PO   1 tab





  BID CHON   Administration














- Exam


General Appearance: awake alert


Eye: PERRL, anicteric sclera


ENT: no oropharyngeal lesions, moist mucosa


Neck: supple, no JVD


Heart: RRR, no murmur


Respiratory: no wheezes, no rales


Gastrointestinal: soft, non-tender, non-distended, normal bowel sounds


Extremities: no edema


Extremities - other findings: left leg erythema+


Neurological: cranial nerve grossly intact, no focal deficits


Psychiatric: normal affect, A&O x 3





Hosp A/P


(1) Left leg cellulitis


Code(s): L03.116 - CELLULITIS OF LEFT LOWER LIMB   Status: Acute   





(2) PVD (peripheral vascular disease)


Code(s): I73.9 - PERIPHERAL VASCULAR DISEASE, UNSPECIFIED   Status: Acute   





(3) DM type 2 (diabetes mellitus, type 2)


Status: Chronic   


Qualifiers: 


   Diabetes mellitus long term insulin use: without long term use 





(4) Dyslipidemia


Code(s): E78.5 - HYPERLIPIDEMIA, UNSPECIFIED   Status: Chronic   





(5) Coronary artery disease


Code(s): I25.10 - ATHSCL HEART DISEASE OF NATIVE CORONARY ARTERY W/O ANG PCTRS  

Status: Chronic   


Qualifiers: 


   Coronary Disease-Associated Artery/Lesion type: bypass graft   Native vs. 

transplanted heart: native heart   Associated angina: without angina   Qualified

Code(s): I25.810 - Atherosclerosis of coronary artery bypass graft(s) without 

angina pectoris   





(6) Hypertension


Code(s): I10 - ESSENTIAL (PRIMARY) HYPERTENSION   Status: Chronic   


Qualifiers: 


   Hypertension type: essential hypertension   Qualified Code(s): I10 - 

Essential (primary) hypertension   





- Plan





is on vanc and ceftriaxone


appreciate help from , may need vascular procedure


continue home meds as above


MRA and CT aorta with run off results noted


hemostable

## 2020-10-06 NOTE — MRI
MRA OF THE LOWER EXTREMITIES WITH IV CONTRAST:

 

INDICATION: 

Define patency of the lower extremity vessels after incomplete CTA.

 

TECHNIQUE:  

There are whole body MRA images obtained of the abdomen and pelvis with bilateral lower extremity run
off.  20 cc of MultiHance was utilized for the examination.

 

FINDINGS: 

There are 2 separate right renal arteries.  There is a single left renal artery.  All renal arteries 
appear patent.  There is moderate narrowing involving the origin of the proximal aspect of the SMA.  
Celiac appears patent.  There is some moderate narrowing involving the origin of the MELINDA.  Both commo
n iliac arteries are patent.  There is patency of both external iliac arteries as some prominent athe
rosclerotic irregularity of the common femoral arteries.

 

There is complete occlusion of the superficial femoral arteries.  There is some reconstitution of gabe
w seen within the mid to distal left SFA through collaterals but with development of another area of 
complete occlusion just proximal to the left adductor hiatus.  There is reconstitution of flow within
 both lower extremities through the profunda femoral collateral vessels into the popliteal arteries w
ith opacification of the trifurcation within both lower extremities.  There is flow below the posteri
or tibial artery through the level of the plantar arch and into the distal foot.  The peroneal artery
 extends to the level of the ankle bilaterally.  The anterior tibial artery extends to the level of t
he ankle on the right lower extremity and on the left lower extremity.

 

IMPRESSION: 

1.  Complete occlusion of the superficial femoral arteries bilaterally with reconstituted flow throug
h collaterals via the deep profunda femoral artery at the level of the popliteal artery bilaterally. 
 There is at least 3-vessel runoff to both lower extremities to the level of the ankle bilaterally.

 

2.  Moderate narrowing involving the origin of the proximal aspect of the superior mesenteric artery.


 

POS: [facility]

## 2020-10-06 NOTE — PRG
DATE OF SERVICE:  10/06/2020



The patient's CT angiogram was performed and reviewed.  He does have some occlusion

of the left superficial femoral artery as well as the right.  His left profunda has

a significant stenosis at its origin.  Unfortunately, no contrast shows up in his

distal vessels and whether this was due to timing of contrast or lack of blood flow

in his distal vessels is not clear.  I think I will go ahead and order an MRA to

better characterize the flow in his lower extremities.  If there is no distal bypass

to be done, consideration could be given to a common and profunda femoral

endarterectomy and if distal vessels are visible, consider the above procedure

combined with a femoral popliteal bypass using [place]-Fernando.  In any event, the patient

is stable and will continue evaluation. 







Job ID:  [id number]

## 2020-10-06 NOTE — MRI
MRA OF THE ABDOMEN WITH IV CONTRAST:

 

INDICATION: 

Atherosclerotic disease of the abdominal aorta with a history of ischemic ulcers to the left foot.

 

COMPARISON: 

Prior CTA of the abdomen and pelvis with bilateral extremity runoff dated 10/5/2020.

 

CONTRAST:

20 cc of MultiHance.

 

FINDINGS: 

There is moderate narrowing involving the origin and proximal aspect of the SMA.  There is patency of
 the celiac and duplicated right renal arteries.  There is patency of the left renal artery with just
 minimal narrowing involving the proximal left renal artery.  There is moderate narrowing involving t
he origin of the MELINDA.  There is prominent atherosclerotic irregularity involving the iliac vasculatur
e.

 

IMPRESSION: 

1.  No aneurysmal dilatation of the aortic arch.

 

2.  Moderate narrowing involving the origin of the proximal aspect of the superior mesenteric artery.


 

3.  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
eft main renal artery.

 

4.  Moderate narrowing involving the origin of the inferior mesenteric artery.

 

POS: [facility]

## 2020-10-06 NOTE — PRG
DATE OF SERVICE:  



CT scan was reviewed this morning and did not show enough contrast getting down the

left lower leg to determine whether revascularization was possible.  I did an MRA of

his lower extremities and indeed his popliteal artery and tibioperoneal vessels do

show up and so I think that he is a candidate for a left common profunda femoral

endarterectomy and then a [place]-Fernando bypass to his popliteal artery probably below the

knee.  I have discussed this with him and he is agreeable to proceed.  I have also

discussed it with his son by phone.  His right superficial femoral artery is also

occluded on the opposite leg; however, symptoms at this time did not warrant any

intervention. 







Job ID:  [id number]

## 2020-10-07 LAB — VANCOMYCIN TROUGH SERPL-MCNC: 14.6 UG/ML

## 2020-10-07 PROCEDURE — 04UL0JZ SUPPLEMENT LEFT FEMORAL ARTERY WITH SYNTHETIC SUBSTITUTE, OPEN APPROACH: ICD-10-PCS | Performed by: THORACIC SURGERY (CARDIOTHORACIC VASCULAR SURGERY)

## 2020-10-07 PROCEDURE — 041L0JL BYPASS LEFT FEMORAL ARTERY TO POPLITEAL ARTERY WITH SYNTHETIC SUBSTITUTE, OPEN APPROACH: ICD-10-PCS | Performed by: THORACIC SURGERY (CARDIOTHORACIC VASCULAR SURGERY)

## 2020-10-07 PROCEDURE — 04CL0ZZ EXTIRPATION OF MATTER FROM LEFT FEMORAL ARTERY, OPEN APPROACH: ICD-10-PCS | Performed by: THORACIC SURGERY (CARDIOTHORACIC VASCULAR SURGERY)

## 2020-10-07 RX ADMIN — CEFTRIAXONE SCH MLS: 2 INJECTION, POWDER, FOR SOLUTION INTRAMUSCULAR; INTRAVENOUS at 11:05

## 2020-10-07 RX ADMIN — HYDROCODONE BITARTRATE AND ACETAMINOPHEN PRN TAB: 5; 325 TABLET ORAL at 21:48

## 2020-10-07 RX ADMIN — VANCOMYCIN SCH MLS: 1.5 INJECTION, SOLUTION INTRAVENOUS at 21:48

## 2020-10-07 RX ADMIN — VANCOMYCIN SCH MLS: 1.5 INJECTION, SOLUTION INTRAVENOUS at 09:24

## 2020-10-07 NOTE — PDOC.HOSPP
- Subjective


Encounter Date: 10/07/20


Encounter Time: 07:45


Subjective: 


left foot and ankle redness is receding well


is able to ambulate a bit





- Objective


Vital Signs & Weight: 


                             Vital Signs (12 hours)











  Temp Pulse Resp BP Pulse Ox


 


 10/07/20 11:00  97.9 F  52 L  20  146/68 H  97


 


 10/07/20 07:35  97.7 F  62  20  154/73 H  98


 


 10/07/20 04:00  97.6 F  57 L  18  158/72 H  97








                                     Weight











Weight                         213 lb














I&O: 


                                        











 10/06/20 10/07/20 10/08/20





 06:59 06:59 06:59


 


Intake Total 240  


 


Balance 240  











Result Diagrams: 


                                 10/06/20 05:27





                                 10/06/20 05:27


Additional Labs: 


                                   Accuchecks











  10/07/20 10/07/20 10/06/20





  12:56 05:19 20:33


 


POC Glucose  101 H  108 H  147 H














  10/06/20





  16:42


 


POC Glucose  159 H














Hospitalist ROS





- Medication


Medications: 


Active Medications











Generic Name Dose Route Start Last Admin





  Trade Name Freq  PRN Reason Stop Dose Admin


 


Famotidine  20 mg  10/05/20 21:00  10/07/20 09:29





  Famotidine 20 Mg Tab  PO   Not Given





  BID CHON  


 


Finasteride  5 mg  10/06/20 09:00  10/07/20 09:29





  Finasteride 5 Mg Tab  PO   Not Given





  DAILY CHON  


 


Gabapentin  600 mg  10/06/20 09:00  10/07/20 09:29





  Gabapentin 300 Mg Cap  PO   Not Given





  DAILY CHON  


 


Gabapentin  1,200 mg  10/05/20 21:00  10/06/20 20:44





  Gabapentin 400 Mg Cap  PO   1,200 mg





  HS CHON   Administration


 


Glimepiride  2 mg  10/05/20 16:30  10/07/20 09:29





  Glimepiride 2 Mg Tab  PO   Not Given





  BID-AC CHON  


 


Vancomycin HCl 1.5 gm/ Device  300 mls @ 200 mls/hr  10/05/20 20:00  10/07/20 

09:24





  IVPB   300 mls





  0800,2000 CHON   Administration


 


Ceftriaxone Sodium 2 gm/  100 mls @ 200 mls/hr  10/06/20 10:00  10/07/20 11:05





  Sodium Chloride  IVPB   100 mls





  1000 CHON   Administration


 


Insulin Human Lispro  0 units  10/05/20 16:16  10/06/20 18:21





  Humalog 300 Units/3 Ml Vial  SC   2 unit





  .MODERATE SLIDING SC PRN   Administration





  Moderate Correctional Scale  


 


Pramipexole Dihydrochloride  0.25 mg  10/05/20 21:00  10/07/20 09:30





  Pramipexole Di-Hcl 0.25 Mg Tab  PO   Not Given





  BID CHON  


 


Rosuvastatin Calcium  10 mg  10/05/20 21:00  10/06/20 20:44





  Rosuvastatin 10 Mg Tab  PO   10 mg





  QPM CHON   Administration


 


Sacubitril/Valsartan  1 tab  10/05/20 21:00  10/07/20 09:30





  Sacubitril 24mg/Valsartan 26mg Tab  PO   Not Given





  BID CHON  














- Exam


General Appearance: awake alert


Eye: PERRL, anicteric sclera


ENT: no oropharyngeal lesions, moist mucosa


Neck: supple, no JVD


Heart: RRR, no murmur


Respiratory: no wheezes, no rales


Gastrointestinal: soft, non-tender, non-distended, normal bowel sounds


Extremities: no cyanosis, no edema


Neurological: cranial nerve grossly intact, no focal deficits


Psychiatric: normal affect, A&O x 3





Hosp A/P


(1) Left leg cellulitis


Code(s): L03.116 - CELLULITIS OF LEFT LOWER LIMB   Status: Acute   





(2) PVD (peripheral vascular disease)


Code(s): I73.9 - PERIPHERAL VASCULAR DISEASE, UNSPECIFIED   Status: Acute   





(3) DM type 2 (diabetes mellitus, type 2)


Status: Chronic   


Qualifiers: 


   Diabetes mellitus long term insulin use: without long term use 





(4) Dyslipidemia


Code(s): E78.5 - HYPERLIPIDEMIA, UNSPECIFIED   Status: Chronic   





(5) Coronary artery disease


Code(s): I25.10 - ATHSCL HEART DISEASE OF NATIVE CORONARY ARTERY W/O ANG PCTRS  

Status: Chronic   


Qualifiers: 


   Coronary Disease-Associated Artery/Lesion type: bypass graft   Native vs. 

transplanted heart: native heart   Associated angina: without angina   Qualified

Code(s): I25.810 - Atherosclerosis of coronary artery bypass graft(s) without 

angina pectoris   





(6) Hypertension


Code(s): I10 - ESSENTIAL (PRIMARY) HYPERTENSION   Status: Chronic   


Qualifiers: 


   Hypertension type: essential hypertension   Qualified Code(s): I10 - 

Essential (primary) hypertension   





- Plan





is on vanc and ceftriaxone


appreciate help from , is npo for vascular procedure today


continue home meds as above


MRA and CT aorta with run off results noted


hemostable

## 2020-10-08 LAB
ANION GAP SERPL CALC-SCNC: 12 MMOL/L (ref 10–20)
BASOPHILS # BLD AUTO: 0 THOU/UL (ref 0–0.2)
BASOPHILS # BLD AUTO: 0 THOU/UL (ref 0–0.2)
BASOPHILS NFR BLD AUTO: 0.3 % (ref 0–1)
BASOPHILS NFR BLD AUTO: 0.4 % (ref 0–1)
BUN SERPL-MCNC: 23 MG/DL (ref 8.4–25.7)
CALCIUM SERPL-MCNC: 8.2 MG/DL (ref 7.8–10.44)
CHLORIDE SERPL-SCNC: 103 MMOL/L (ref 98–107)
CO2 SERPL-SCNC: 21 MMOL/L (ref 23–31)
CREAT CL PREDICTED SERPL C-G-VRATE: 105 ML/MIN (ref 70–130)
EOSINOPHIL # BLD AUTO: 0.2 THOU/UL (ref 0–0.7)
EOSINOPHIL # BLD AUTO: 0.2 THOU/UL (ref 0–0.7)
EOSINOPHIL NFR BLD AUTO: 2.2 % (ref 0–10)
EOSINOPHIL NFR BLD AUTO: 2.6 % (ref 0–10)
GLUCOSE SERPL-MCNC: 144 MG/DL (ref 83–110)
HGB BLD-MCNC: 14.4 G/DL (ref 14–18)
HGB BLD-MCNC: 14.7 G/DL (ref 14–18)
LYMPHOCYTES # BLD: 1.2 THOU/UL (ref 1.2–3.4)
LYMPHOCYTES # BLD: 1.2 THOU/UL (ref 1.2–3.4)
LYMPHOCYTES NFR BLD AUTO: 12.1 % (ref 21–51)
LYMPHOCYTES NFR BLD AUTO: 15.5 % (ref 21–51)
MAGNESIUM SERPL-MCNC: 1.9 MG/DL (ref 1.6–2.6)
MCH RBC QN AUTO: 32.2 PG (ref 27–31)
MCH RBC QN AUTO: 32.6 PG (ref 27–31)
MCV RBC AUTO: 100 FL (ref 78–98)
MCV RBC AUTO: 97.7 FL (ref 78–98)
MONOCYTES # BLD AUTO: 0.8 THOU/UL (ref 0.11–0.59)
MONOCYTES # BLD AUTO: 0.8 THOU/UL (ref 0.11–0.59)
MONOCYTES NFR BLD AUTO: 8.2 % (ref 0–10)
MONOCYTES NFR BLD AUTO: 9.9 % (ref 0–10)
NEUTROPHILS # BLD AUTO: 5.7 THOU/UL (ref 1.4–6.5)
NEUTROPHILS # BLD AUTO: 7.4 THOU/UL (ref 1.4–6.5)
NEUTROPHILS NFR BLD AUTO: 71.7 % (ref 42–75)
NEUTROPHILS NFR BLD AUTO: 77.1 % (ref 42–75)
PLATELET # BLD AUTO: 109 THOU/UL (ref 130–400)
PLATELET # BLD AUTO: 111 THOU/UL (ref 130–400)
POTASSIUM SERPL-SCNC: 3.9 MMOL/L (ref 3.5–5.1)
RBC # BLD AUTO: 4.42 MILL/UL (ref 4.7–6.1)
RBC # BLD AUTO: 4.56 MILL/UL (ref 4.7–6.1)
SARS-COV-2 IGG SERPL IA-ACNC: 6.4 S/CO (ref ?–1.4)
SODIUM SERPL-SCNC: 132 MMOL/L (ref 136–145)
WBC # BLD AUTO: 7.9 THOU/UL (ref 4.8–10.8)
WBC # BLD AUTO: 9.6 THOU/UL (ref 4.8–10.8)

## 2020-10-08 RX ADMIN — HYDROCODONE BITARTRATE AND ACETAMINOPHEN PRN TAB: 5; 325 TABLET ORAL at 18:40

## 2020-10-08 RX ADMIN — Medication SCH ML: at 10:35

## 2020-10-08 RX ADMIN — CEFTRIAXONE SCH MLS: 2 INJECTION, POWDER, FOR SOLUTION INTRAMUSCULAR; INTRAVENOUS at 11:16

## 2020-10-08 RX ADMIN — VANCOMYCIN SCH MLS: 1.5 INJECTION, SOLUTION INTRAVENOUS at 08:05

## 2020-10-08 NOTE — PDOC.HOSPP
- Subjective


Encounter Date: 10/08/20


Encounter Time: 09:30


Subjective: 


feels better, is slowly mobilizing this am


no chest pain or palp





- Objective


Vital Signs & Weight: 


                             Vital Signs (12 hours)











  Temp Pulse Resp BP Pulse Ox


 


 10/08/20 08:38  98.0 F  61  18  97/53 L  92 L


 


 10/08/20 08:00      92 L








                                     Weight











Weight                         213 lb














I&O: 


                                        











 10/07/20 10/08/20 10/09/20





 06:59 06:59 06:59


 


Output Total   1600


 


Balance   -1600











Result Diagrams: 


                                 10/08/20 04:46





                                 10/08/20 00:34


Additional Labs: 


                                   Accuchecks











  10/08/20 10/08/20 10/07/20





  11:35 06:48 21:52


 


POC Glucose  116 H  106 H  91














  10/07/20 10/07/20





  12:56 11:21


 


POC Glucose  101 H  112 H














Hospitalist ROS





- Medication


Medications: 


Active Medications











Generic Name Dose Route Start Last Admin





  Trade Name Freq  PRN Reason Stop Dose Admin


 


Hydrocodone Bitart/Acetaminophen  2 tab  10/07/20 17:04  10/07/20 21:48





  Hydrocodone/Acetaminophen 5/325 Mg Tablet  PO   2 tab





  Q4H PRN   Administration





  Pain 7-10  


 


Clopidogrel Bisulfate  75 mg  10/08/20 09:00  10/08/20 08:05





  Clopidogrel Bisulfate 75 Mg Tab  PO   75 mg





  DAILY CHON   Administration


 


Famotidine  20 mg  10/05/20 21:00  10/08/20 09:00





  Famotidine 20 Mg Tab  PO   20 mg





  BID CHON   Administration


 


Finasteride  5 mg  10/06/20 09:00  10/08/20 08:05





  Finasteride 5 Mg Tab  PO   5 mg





  DAILY CHON   Administration


 


Gabapentin  600 mg  10/06/20 09:00  10/08/20 08:05





  Gabapentin 300 Mg Cap  PO   600 mg





  DAILY CHON   Administration


 


Gabapentin  1,200 mg  10/05/20 21:00  10/07/20 21:50





  Gabapentin 400 Mg Cap  PO   1,200 mg





  HS CHON   Administration


 


Glimepiride  2 mg  10/05/20 16:30  10/08/20 08:06





  Glimepiride 2 Mg Tab  PO   2 mg





  BID-AC CHON   Administration


 


Vancomycin HCl 1.5 gm/ Device  300 mls @ 200 mls/hr  10/05/20 20:00  10/08/20 

08:05





  IVPB   300 mls





  0800,2000 CHON   Administration


 


Ceftriaxone Sodium 2 gm/  100 mls @ 200 mls/hr  10/06/20 10:00  10/08/20 11:16





  Sodium Chloride  IVPB   100 mls





  1000 CHON   Administration


 


Sodium Chloride  1,000 mls @ 50 mls/hr  10/08/20 07:15  10/08/20 07:23





  Normal Saline 0.9%  IV   1,000 mls





  .Q20H CHON   Administration


 


Insulin Human Lispro  0 units  10/05/20 16:16  10/06/20 18:21





  Humalog 300 Units/3 Ml Vial  SC   2 unit





  .MODERATE SLIDING SC PRN   Administration





  Moderate Correctional Scale  


 


Pramipexole Dihydrochloride  0.25 mg  10/05/20 21:00  10/08/20 08:05





  Pramipexole Di-Hcl 0.25 Mg Tab  PO   0.25 mg





  BID CHON   Administration


 


Rosuvastatin Calcium  10 mg  10/05/20 21:00  10/07/20 21:52





  Rosuvastatin 10 Mg Tab  PO   10 mg





  QPM CHON   Administration


 


Sacubitril/Valsartan  1 tab  10/05/20 21:00  10/08/20 08:06





  Sacubitril 24mg/Valsartan 26mg Tab  PO   1 tab





  BID CHON   Administration


 


Sodium Chloride  10 ml  10/08/20 09:00  10/08/20 10:35





  Flush - Normal Saline 10 Ml Syringe  IVF   10 ml





  Q12HR CHON   Administration














- Exam


General Appearance: awake alert


Eye: PERRL, anicteric sclera


ENT: no oropharyngeal lesions, moist mucosa


Neck: supple, no JVD


Heart: RRR, no murmur


Respiratory: no wheezes, no rales


Gastrointestinal: soft, non-tender, non-distended, normal bowel sounds


Extremities: no cyanosis, no edema


Neurological: cranial nerve grossly intact, no focal deficits


Psychiatric: normal affect, A&O x 3





Hosp A/P


(1) Left leg cellulitis


Code(s): L03.116 - CELLULITIS OF LEFT LOWER LIMB   Status: Acute   





(2) PVD (peripheral vascular disease)


Code(s): I73.9 - PERIPHERAL VASCULAR DISEASE, UNSPECIFIED   Status: Acute   





(3) DM type 2 (diabetes mellitus, type 2)


Status: Chronic   


Qualifiers: 


   Diabetes mellitus long term insulin use: without long term use 





(4) Dyslipidemia


Code(s): E78.5 - HYPERLIPIDEMIA, UNSPECIFIED   Status: Chronic   





(5) Coronary artery disease


Code(s): I25.10 - ATHSCL HEART DISEASE OF NATIVE CORONARY ARTERY W/O ANG PCTRS  

Status: Chronic   


Qualifiers: 


   Coronary Disease-Associated Artery/Lesion type: bypass graft   Native vs. 

transplanted heart: native heart   Associated angina: without angina   Qualified

Code(s): I25.810 - Atherosclerosis of coronary artery bypass graft(s) without 

angina pectoris   





(6) Hypertension


Code(s): I10 - ESSENTIAL (PRIMARY) HYPERTENSION   Status: Chronic   


Qualifiers: 


   Hypertension type: essential hypertension   Qualified Code(s): I10 - 

Essential (primary) hypertension   





- Plan





s/p left fempop with endarterectomy of profunda


is on vanc and ceftriaxone, plan to switch to oral antibiotics in am


appreciate help from 


continue home meds as above


MRA and CT aorta with run off results noted


hemostable


Likely dc plan in am if cleared by 


will need HH with nursing and PT on discharge if he didn't ambulate much here.

## 2020-10-08 NOTE — OP
DATE OF PROCEDURE:  10/07/2020



PREOPERATIVE DIAGNOSIS:  Gangrene, left great toe.



PROCEDURES PERFORMED:  Left common femoral endarterectomy including the profunda

femoral with patch of the common femoral with a bovine patch and then a distal

common femoral to infrageniculate popliteal artery with an 8 mm thin-walled

[place]ando. 



ANESTHESIA:  General.



EBL:  300 mL.



DESCRIPTION OF PROCEDURE:  After adequate anesthesia had been obtained, the patient

was prepped and draped and simultaneously an incision was made in the groin area as

well as the medial aspect of the upper lower leg.  Popliteal artery was isolated for

about 4 cm and then the common femoral artery was isolated from just below the

inguinal ligament to the bifurcation of the deep femoral artery.  Following

tunneling from the groin to the infrageniculate popliteal artery, an 8 mm

thin-walled graft was brought through this tunnel and 7500 units of heparin given

and ACT was checked.  Popliteal artery was opened and the ringed [place]ando was

anastomosed in an end-to-side fashion there and wound packed.  The common femoral

artery was then clamped proximally and then the profunda branches including a

posterior branch which was controlled with a loop in the mid common femoral artery.

Superficial femoral artery was clamped as it was patent for about 3 cm. 



Arteriotomy performed with a 10 blade and endarterectomy was performed of the common

femoral artery extending down the superficial femoral, about 2 cm and then down the

main profunda branch that was nearly occluded.  The posterior branch also was

cleaned out and following this, a bovine patch was used to close the common femoral

artery extending onto the profunda femoral artery.  The incision had also been

extended onto the superficial femoral artery into this area.  The ringed [place]-F[place]ando

was anastomosed to the superficial femoral artery and then to the bovine patch to

complete 

the suture line.  After back flushing and forward flushing, flow was restored, and

after a prolonged period of obtaining hemostasis after protamine administration

including use of FloSeal, the wounds were finally hemostatic and closed in layers

and the patient is to be taken to the recovery room. 







Job ID:  [id number]

## 2020-10-09 VITALS — TEMPERATURE: 98.2 F | SYSTOLIC BLOOD PRESSURE: 114 MMHG | DIASTOLIC BLOOD PRESSURE: 66 MMHG

## 2020-10-09 RX ADMIN — Medication SCH: at 09:01

## 2020-10-09 RX ADMIN — Medication SCH ML: at 03:40

## 2020-10-09 RX ADMIN — CEFTRIAXONE SCH: 2 INJECTION, POWDER, FOR SOLUTION INTRAMUSCULAR; INTRAVENOUS at 11:56

## 2020-10-09 NOTE — PRG
DATE OF SERVICE:  10/09/2020



Uneventful night.  Segura catheter was removed and he voided well.  His leg incisions

are clean and dry and he has only mild edema of his lower leg with good red color.

He had excellent Doppler signals in his DP and PT yesterday compared to only a

monophasic signal in his PT preop.  He is ready for discharge today and he is going

to try Tylenol and ibuprofen for pain at home.  I do not think there is any need for

home antibiotics and I will follow him up in 2 to 3 weeks.  He has been given

discharge and wound care instructions and understands. 







Job ID:  [id number]

## 2020-10-09 NOTE — DIS
DATE OF ADMISSION:  10/05/2020



DATE OF DISCHARGE:  10/09/2020



DISCHARGE DISPOSITION:  Home.



PRIMARY DISCHARGE DIAGNOSES:  Left leg cellulitis with peripheral vascular disease,

status post left common femoral endarterectomy including the profunda femoral with

patch, common femoral with a bovine patch, then a distal common femoral to

infrageniculate popliteal artery with 8 mm thin-walled [place]ando graft.  All of the

above procedures were done by Dr. Osman on 10/07/2020. 



SECONDARY DISCHARGE DIAGNOSES:  Diabetes mellitus type 2, dyslipidemia, coronary

artery disease, and hypertension. 



PROCEDURES DONE DURING HOSPITALIZATION:  CT angio aorta bilateral with runoff showed

suboptimal evaluation of left and right lower extremity arterial system due to poor

timing of contrast bolus.  There does appear to be significant arthrosclerosis and

occlusion involving bilateral superficial femoral arteries and popliteal arteries.

MR angiogram with IV contrast of lower extremities done showed complete occlusion of

superficial femoral arteries bilaterally with reconstituted flow through

collaterals, where the deep profunda femoral artery at the level of the popliteal

artery bilaterally.  There is at least three-vessel runoff to both lower extremities

to the level of ankle bilaterally.  Moderate narrowing of the origin of proximal

aspect of superior mesenteric artery.  The patient has had left common femoral

endarterectomy including profunda femoral with patch, the common femoral with a

bovine patch and then a distal common femoral to infrageniculate popliteal artery

with 8 mm thin-walled [place]ando done on 10/07/2020 by Dr. Osman.  H and H 14 and 44,

platelet count 109, BUN 23, creatinine 0.8.  COVID-19 PCR was not detected on

10/07/2020.  IgG antibody COVID-19 was reactive with index of 6.40. 



DISCHARGE MEDICATIONS:  

1. Plavix 75 mg p.o. daily.

2. Coreg 3.125 mg p.o. twice daily.

3. Aspirin 81 mg p.o. daily.

4. Glimepiride 4 mg twice daily.

5. Entresto 24/26 mg one tablet twice daily.

6. Finasteride 5 mg daily.

7. Gabapentin 1200 mg p.o. q.h.s. and 600 mg p.o. daily.

8. Empagliflozin 10 mg p.o. daily.

9. Pramipexole 0.25 mg p.o. twice daily.

10. Rosuvastatin 10 mg p.o. q.p.m.



ALLERGIES:  NO KNOWN DRUG ALLERGIES.



DISCHARGE PLAN:  The patient to follow up with Dr. Alvarez, his primary care

physician, in 1 week and Dr. Osman in 2 to 3 weeks. 



BRIEF COURSE DURING HOSPITALIZATION:  The patient initially got admitted on the 5th of October after he failed outpatient antibiotics for left leg and foot cellulitis

with nonhealing ulcer over the tip of the great toe on the plantar aspect.  He has

known history of peripheral vascular disease, and a CT and MR angiogram of the lower

extremities were obtained.  He has had consultation with Dr. Osman.  The patient has

had vascular procedures as described above for the left lower extremity.  He was on

broad-spectrum antibiotics, and this has been discontinued at the time of discharge.

 He is ambulating and is eating well.  He is wanting to go home today.  Dr. Osman

has cleared him for discharge.  He needs to follow up with Dr. Osman in 2 to 3 weeks

and primary care physician in 1 week.  Please note I have seen and examined the

patient on the day of discharge. 







Job ID:  [id number]

## 2021-01-31 ENCOUNTER — HOSPITAL ENCOUNTER (INPATIENT)
Dept: HOSPITAL 92 - ERS | Age: 76
LOS: 3 days | Discharge: HOME | DRG: 280 | End: 2021-02-03
Attending: INTERNAL MEDICINE | Admitting: INTERNAL MEDICINE
Payer: MEDICARE

## 2021-01-31 VITALS — BODY MASS INDEX: 27.2 KG/M2

## 2021-01-31 DIAGNOSIS — E78.00: ICD-10-CM

## 2021-01-31 DIAGNOSIS — I21.4: Primary | ICD-10-CM

## 2021-01-31 DIAGNOSIS — Z79.899: ICD-10-CM

## 2021-01-31 DIAGNOSIS — R18.8: ICD-10-CM

## 2021-01-31 DIAGNOSIS — Z79.811: ICD-10-CM

## 2021-01-31 DIAGNOSIS — I25.5: ICD-10-CM

## 2021-01-31 DIAGNOSIS — E11.51: ICD-10-CM

## 2021-01-31 DIAGNOSIS — Z87.891: ICD-10-CM

## 2021-01-31 DIAGNOSIS — E78.5: ICD-10-CM

## 2021-01-31 DIAGNOSIS — Z95.4: ICD-10-CM

## 2021-01-31 DIAGNOSIS — B15.9: ICD-10-CM

## 2021-01-31 DIAGNOSIS — Z79.01: ICD-10-CM

## 2021-01-31 DIAGNOSIS — I35.0: ICD-10-CM

## 2021-01-31 DIAGNOSIS — Z79.82: ICD-10-CM

## 2021-01-31 DIAGNOSIS — R79.89: ICD-10-CM

## 2021-01-31 DIAGNOSIS — Z83.1: ICD-10-CM

## 2021-01-31 DIAGNOSIS — I50.43: ICD-10-CM

## 2021-01-31 DIAGNOSIS — I25.810: ICD-10-CM

## 2021-01-31 DIAGNOSIS — R19.7: ICD-10-CM

## 2021-01-31 DIAGNOSIS — E11.40: ICD-10-CM

## 2021-01-31 DIAGNOSIS — Z90.49: ICD-10-CM

## 2021-01-31 DIAGNOSIS — I11.0: ICD-10-CM

## 2021-01-31 DIAGNOSIS — Z95.5: ICD-10-CM

## 2021-01-31 DIAGNOSIS — Z95.1: ICD-10-CM

## 2021-01-31 DIAGNOSIS — Z20.822: ICD-10-CM

## 2021-01-31 LAB
CK MB SERPL-MCNC: 4.4 NG/ML (ref 0–6.6)
TROPONIN I SERPL DL<=0.01 NG/ML-MCNC: 0.32 NG/ML (ref ?–0.03)
TROPONIN I SERPL DL<=0.01 NG/ML-MCNC: 0.32 NG/ML (ref ?–0.03)

## 2021-01-31 PROCEDURE — U0005 INFEC AGEN DETEC AMPLI PROBE: HCPCS

## 2021-01-31 PROCEDURE — 93306 TTE W/DOPPLER COMPLETE: CPT

## 2021-01-31 PROCEDURE — 80053 COMPREHEN METABOLIC PANEL: CPT

## 2021-01-31 PROCEDURE — 87635 SARS-COV-2 COVID-19 AMP PRB: CPT

## 2021-01-31 PROCEDURE — 93005 ELECTROCARDIOGRAM TRACING: CPT

## 2021-01-31 PROCEDURE — 36416 COLLJ CAPILLARY BLOOD SPEC: CPT

## 2021-01-31 PROCEDURE — U0003 INFECTIOUS AGENT DETECTION BY NUCLEIC ACID (DNA OR RNA); SEVERE ACUTE RESPIRATORY SYNDROME CORONAVIRUS 2 (SARS-COV-2) (CORONAVIRUS DISEASE [COVID-19]), AMPLIFIED PROBE TECHNIQUE, MAKING USE OF HIGH THROUGHPUT TECHNOLOGIES AS DESCRIBED BY CMS-2020-01-R: HCPCS

## 2021-01-31 PROCEDURE — 36415 COLL VENOUS BLD VENIPUNCTURE: CPT

## 2021-01-31 PROCEDURE — 82553 CREATINE MB FRACTION: CPT

## 2021-01-31 PROCEDURE — 76705 ECHO EXAM OF ABDOMEN: CPT

## 2021-01-31 PROCEDURE — 80074 ACUTE HEPATITIS PANEL: CPT

## 2021-01-31 PROCEDURE — G0378 HOSPITAL OBSERVATION PER HR: HCPCS

## 2021-01-31 PROCEDURE — 85025 COMPLETE CBC W/AUTO DIFF WBC: CPT

## 2021-01-31 PROCEDURE — 96372 THER/PROPH/DIAG INJ SC/IM: CPT

## 2021-01-31 PROCEDURE — 96376 TX/PRO/DX INJ SAME DRUG ADON: CPT

## 2021-01-31 PROCEDURE — 96374 THER/PROPH/DIAG INJ IV PUSH: CPT

## 2021-01-31 NOTE — HP
PRIMARY CARE PHYSICIAN:  Dr. Sandoval.



CHIEF COMPLAINT:  Nausea, vomiting, diarrhea, and abdominal swelling.



HISTORY OF PRESENT ILLNESS:  The patient was seen at [place] [facility] today for evaluation

of nausea, vomiting, diarrhea, and dehydration.  He reports that his abdominal area

has been swelling, feels more swollen and he has had a hard time breathing.  He

reports the nausea, vomiting have been ongoing for the last week.  He reports that

he has lost his son two weeks ago due to COVID.  Reports that he lost his wife last

August due to COVID.  He says he has been tested seven times and it has come back

negative.  He reports that Dr. Sandoval started him on a new medication, but it lasted

today and he was back to feeling very bloated and not well.  He does have a history

of coronary artery disease, status post CABG, but he denies any chest pain.  By the

time we saw him in the ER, he had been given Lasix at [facility] after being given fluids over in [place] and he had been able to urinate quite

a bit of fluid out.  He reports that the bloating feeling, shortness of breath have

resolved and generally he feels a little bit better.  He is sad due to loss of his

family members in the last several months, but denies any SI, SA.  He reports that

he has a lot to do at home and wants to get back to feeling better, so he can go do

it.  Workup in the ER with white blood cell count of 5.1, hemoglobin 11.7,

hematocrit is 42.2, and platelet count is 128, which is at baseline for this

patient.  Glucose was 158.  His AST was elevated at 53, .  These are slightly

more elevated than they were the last time they were checked.  BNP on this visit was

3363, which is significantly higher than it was when it was checked in October of 2020.  He had an abdominal pelvis CT, which showed a small moderate amount of volume

of ascites, bilateral pleural effusions, anasarca, heavily calcified prostate gland,

severe lumbar spondylosis.  No small bowel obstruction or any obstructive uropathy.

He was transferred from [place] to [facility], where he was

given a dose of Lasix, felt a little bit better and then admitted to telemetry for

further management.  His troponins have trended upwards, they started out at 0.067

and the third one is 0.315. 



REVIEW OF SYSTEMS:  The patient denies fever, chills, or cough.  He does report some

shortness of breath, which have improved after the Lasix.  He does report some

abdominal distention, nausea, vomiting, generally unwell.  Does report feelings of

sadness and grief.  All others systems are reviewed and are negative unless

mentioned above in HPI. 



PAST MEDICAL HISTORY:  Coronary artery disease, congestive heart failure, diabetes,

hyperlipidemia, hypertension. 



PAST SURGICAL HISTORY:  Cardiac stent x2, coronary artery bypass graft, 5 vessel.

He has also had a valve replaced.  Surgical history of cholecystectomy. 



PSYCHIATRIC HISTORY:  None.



SOCIAL HISTORY:  He does drink alcohol every day.  Denies any drug use.  Has no

smoking history. 



ALLERGIES:  NONE.



CURRENT MEDICATIONS:  

1. Pramipexole 0.25 mg p.o. b.i.d.

2. Gabapentin 600 mg b.i.d.

3. Plavix 75 mg p.o. once a day.

4. Crestor 10 mg p.o. once a day.

5. Jardiance 25 mg p.o. once a day. 

6. Aspirin 81 mg p.o. once a day.

7. Diclofenac 75 mg p.o. b.i.d.

8. Carvedilol 3.125 p.o. b.i.d.

9. Amaryl 4 mg p.o. once a day.

10. Entresto 24/26 once a day.

11. Lasix 20 mg p.o. once a day.



PHYSICAL EXAMINATION:

VITAL SIGNS:  Blood pressure 119/77, pulse is 100, respiratory rate is 20, pO2 sats

are 97% on room air, temperature is 98.1. 

CONSTITUTIONAL:  He is nontoxic appearing.  He is alert and oriented to person,

place, and time.  He is hard of hearing, but does answer questions appropriately. 

HEENT:  Head is atraumatic and normocephalic.  Eyes, pupils are equally round and

reactive to light.  Extraocular muscles are intact.  ENT, mouth exam is normal.

Mucous membranes are moist.  He is wearing hearing aids. 

NECK:  Normal range of motion.  Trachea is midline. 

RESPIRATORY/CHEST:  Breath sounds are clear.  Chest expansion is equal. 

CARDIOVASCULAR:  Regular rate and rhythm.  Heart sounds are normal. 

ABDOMEN:  Nontender.  Bowel sounds are heard.  Belly is protuberant. 

BACK:  Normal range of motion.  No tenderness. 

EXTREMITIES:  Upper extremity normal range of motion.  Motor strength is normal.

Lower extremity, he has 2+ pitting edema.  Normal range of motion.  Motor strength

is normal.  Pedal pulses are normal. 

NEURO:  The patient is oriented to person, place, and time. 

SKIN:  Warm and dry.  Normal in color. 

PSYCH:  He has a flat affect.  Expresses feelings of grief.



ASSESSMENT AND PLAN:  

1. Congestive heart failure exacerbation.  The patient was given Lasix in the

emergency room, which appears to help the symptoms.  He reports that he has no

longer short of breath.  He believes his swelling in the main abdomen cavity has

decreased.  He does have +2 pitting edema bilaterally.  We will continue the Lasix

IV push daily.  His troponin did trend up and so we have added Lovenox 1 mg/kg.  We

will add Cardiology consult.  We would appreciate their recommendations moving

forward. 

2. History of coronary artery disease.  We will continue his aspirin and Plavix.

3. History of neuropathy.  We will continue his home medications.

4. History of hypertension.  We will continue his home medications.

5. History of diabetes type 2, add before meals and at bedtime Accu-Cheks sliding

scale as needed for coverage. 

6. Grief.  We have added palliative care consult to help with resources for the

recent loss of his family members. 

7. The patient would like to be a DNR.

8. Peptic ulcer disease and deep venous thrombosis prevention started.

9. Case discussed with Dr. Braun.

10. Hospital course dependent on clinical findings.







Job ID:  [id number]

## 2021-02-01 LAB
ALBUMIN SERPL BCG-MCNC: 3.2 G/DL (ref 3.4–4.8)
ALP SERPL-CCNC: 98 U/L (ref 40–110)
ALT SERPL W P-5'-P-CCNC: 437 U/L (ref 8–55)
ANION GAP SERPL CALC-SCNC: 11 MMOL/L (ref 10–20)
AST SERPL-CCNC: 367 U/L (ref 5–34)
BASOPHILS # BLD AUTO: 0.1 THOU/UL (ref 0–0.2)
BASOPHILS NFR BLD AUTO: 1.1 % (ref 0–1)
BILIRUB SERPL-MCNC: 3 MG/DL (ref 0.2–1.2)
BUN SERPL-MCNC: 25 MG/DL (ref 8.4–25.7)
CALCIUM SERPL-MCNC: 8.6 MG/DL (ref 7.8–10.44)
CHLORIDE SERPL-SCNC: 106 MMOL/L (ref 98–107)
CO2 SERPL-SCNC: 26 MMOL/L (ref 23–31)
CREAT CL PREDICTED SERPL C-G-VRATE: 96 ML/MIN (ref 70–130)
EOSINOPHIL # BLD AUTO: 0.1 THOU/UL (ref 0–0.7)
EOSINOPHIL NFR BLD AUTO: 1 % (ref 0–10)
GLOBULIN SER CALC-MCNC: 3 G/DL (ref 2.4–3.5)
GLUCOSE SERPL-MCNC: 115 MG/DL (ref 83–110)
HBCM INDEX: 0.09 S/CO (ref 0–0.79)
HBSAG INDEX: 0.21 S/CO (ref 0–0.99)
HEP A IGM S/CO: 0.99 S/CO (ref 0–0.79)
HEP C INDEX: 0.17 S/CO (ref 0–0.79)
HGB BLD-MCNC: 10.9 G/DL (ref 14–18)
LYMPHOCYTES # BLD: 2 THOU/UL (ref 1.2–3.4)
LYMPHOCYTES NFR BLD AUTO: 32.2 % (ref 21–51)
MCH RBC QN AUTO: 22 PG (ref 27–31)
MCV RBC AUTO: 77.6 FL (ref 78–98)
MDIFF COMPLETE?: YES
MONOCYTES # BLD AUTO: 0.9 THOU/UL (ref 0.11–0.59)
MONOCYTES NFR BLD AUTO: 14.1 % (ref 0–10)
NEUTROPHILS # BLD AUTO: 3.3 THOU/UL (ref 1.4–6.5)
NEUTROPHILS NFR BLD AUTO: 51.6 % (ref 42–75)
PLATELET # BLD AUTO: 112 THOU/UL (ref 130–400)
POTASSIUM SERPL-SCNC: 4 MMOL/L (ref 3.5–5.1)
RBC # BLD AUTO: 4.94 MILL/UL (ref 4.7–6.1)
SODIUM SERPL-SCNC: 139 MMOL/L (ref 136–145)
WBC # BLD AUTO: 6.3 THOU/UL (ref 4.8–10.8)

## 2021-02-01 RX ADMIN — ASPIRIN 81 MG CHEWABLE TABLET SCH MG: 81 TABLET CHEWABLE at 09:24

## 2021-02-01 NOTE — ULT
US Gallbladder RUQ: 2/1/2021 11:17 AM



CLINICAL HISTORY: Right upper quadrant abdominal pain.



STUDY: Limited right upper quadrant ultrasound of abdomen.



COMPARISON: CT abdomen/pelvis 1/31/2021                  



FINDINGS:



Liver:

Size: Normal. 

Echogenicity: Normal.

Contour: Smooth.

Mass: None.

There is a small amount of ascites in the right upper quadrant.



Bile ducts: No intrahepatic or extrahepatic biliary dilatation.  Common bile duct measures  5 mm. 



Gallbladder: Absent



Pancreas:  Head, body, and tail appear normal.



Right kidney: No pelvicalyceal dilatation. Right kidney measuring 11.9 cm in length.



There is a small right pleural effusion



IMPRESSION:





1. No acute hepatic or biliary abnormality

2. Small ascites



Reported By: Rd Flannery 

Electronically Signed:  2/1/2021 12:15 PM

## 2021-02-01 NOTE — PDOC.HOSPP
- Subjective


Encounter Date: 02/01/21


Encounter Time: 10:52


Subjective: 


 was seen today in follow-up of CHF exacerbation. He says he feels much 

better. The symptoms he experienced on admission have completely resolved. He 

says he is breathing better. He also says he is eating better as well. 





- Objective


Vital Signs & Weight: 


                             Vital Signs (12 hours)











  Temp Pulse Resp BP BP Pulse Ox


 


 02/01/21 09:22  98.0 F  93  20  132/76   95


 


 02/01/21 03:47  97.8 F  94  18   113/52 L  94 L


 


 02/01/21 00:00   96    








                                     Weight











Weight                         218 lb














I&O: 


                                        











 01/31/21 02/01/21 02/02/21





 06:59 06:59 06:59


 


Intake Total  360 


 


Balance  360 











Result Diagrams: 


                                 02/01/21 04:53





                                 02/01/21 04:53


Additional Labs: 


                                   Accuchecks











  01/31/21





  21:38


 


POC Glucose  101 H














Hospitalist ROS





- Medication


Medications: 


Active Medications











Generic Name Dose Route Start Last Admin





  Trade Name Freq  PRN Reason Stop Dose Admin


 


Aspirin  81 mg  02/01/21 09:00  02/01/21 09:24





  Aspirin Chewable 81 Mg Tab  PO   81 mg





  DAILY CHON   Administration


 


Carvedilol  3.125 mg  02/01/21 08:00  02/01/21 09:26





  Carvedilol 3.125 Mg Tab  PO   3.125 mg





  BID-WM CHON   Administration


 


Clopidogrel Bisulfate  75 mg  02/01/21 09:00  02/01/21 09:26





  Clopidogrel Bisulfate 75 Mg Tab  PO   75 mg





  DAILY CHON   Administration


 


Enoxaparin Sodium  100 mg  02/01/21 09:00  02/01/21 09:26





  Enoxaparin Sodium 100 Mg/Ml Syringe  SC   100 mg





  0900,2100 CHON   Administration


 


Famotidine  20 mg  01/31/21 21:00  02/01/21 09:26





  Famotidine 20 Mg Tab  PO   20 mg





  BID CHON   Administration


 


Finasteride  5 mg  02/01/21 09:00  02/01/21 09:26





  Finasteride 5 Mg Tab  PO   5 mg





  DAILY CHON   Administration


 


Furosemide  40 mg  02/01/21 09:00  02/01/21 09:26





  Furosemide 40 Mg/4 Ml Vial  SLOW IVP   40 mg





  DAILY CHON   Administration


 


Gabapentin  600 mg  02/01/21 09:00  02/01/21 09:25





  Gabapentin 300 Mg Cap  PO   600 mg





  0900,1200 CHON   Administration


 


Pramipexole Dihydrochloride  0.25 mg  02/01/21 09:00  02/01/21 09:26





  Pramipexole Di-Hcl 0.25 Mg Tab  PO   0.25 mg





  BID CHON   Administration


 


Sacubitril/Valsartan  1 tab  02/01/21 09:00  02/01/21 09:26





  Sacubitril 24mg/Valsartan 26mg Tab  PO   1 tab





  BID CHON   Administration














Hospitalist Exam


Vitals: 


                             Vital Signs (12 hours)











  Temp Pulse Resp BP BP Pulse Ox


 


 02/01/21 09:22  98.0 F  93  20  132/76   95


 


 02/01/21 03:47  97.8 F  94  18   113/52 L  94 L


 


 02/01/21 00:00   96    








                                     Weight











Weight                         218 lb














Eye: PERRL, anicteric sclera


Heart: RRR ([id number] murmur, with S3), murmur present, II/IV


Respiratory: CTAB, no wheezes, no rales, no ronchi, normal chest expansion


Gastrointestinal: soft (+ mildly distended, Liver is enlarged, and + flank 

dullness)


Extremities: no cyanosis (pulses diminished), 1+ LE edema





Hosp A/P


(1) Acute on chronic systolic (congestive) heart failure


Code(s): I50.23 - ACUTE ON CHRONIC SYSTOLIC (CONGESTIVE) HEART FAILURE   Status:

Acute   





(2) PVD (peripheral vascular disease)


Code(s): I73.9 - PERIPHERAL VASCULAR DISEASE, UNSPECIFIED   Status: Acute   





(3) Coronary artery disease


Code(s): I25.10 - ATHSCL HEART DISEASE OF NATIVE CORONARY ARTERY W/O ANG PCTRS  

Status: Chronic   


Qualifiers: 


   Coronary Disease-Associated Artery/Lesion type: bypass graft   Native vs. 

transplanted heart: native heart   Associated angina: without angina   Qualified

Code(s): I25.810 - Atherosclerosis of coronary artery bypass graft(s) without 

angina pectoris   





(4) Hypertension


Code(s): I10 - ESSENTIAL (PRIMARY) HYPERTENSION   Status: Chronic   


Qualifiers: 


   Hypertension type: essential hypertension   Qualified Code(s): I10 - 

Essential (primary) hypertension   





- Plan





* Acute on chronic combined systolic and diastolic heart failure- discussed with

  ARMANDO Olvera- will continue to diurese


* HTN- blood pressure is stable


* CAD- patient has inoperable CAD according to Cardiologist- will treat 

  medically


* Elevated Liver enzymes- this is likely due to passive congestion of the liver 

  form CHF- however he says he has a history of Hepatitis C- will check aaaaaaan

  ultrasound of the liver

## 2021-02-01 NOTE — CON
DATE OF CONSULTATION:  2021



REASON FOR CONSULTATION:  Positive troponins and heart failure.



PRIMARY CARDIOLOGIST:  Kenton Madrid MD.



HISTORY OF PRESENT ILLNESS:  Mr. Grove is a very pleasant 75-year-old white

gentleman, who comes to the hospital for nausea, vomiting, diarrhea, and abdominal

swelling.  He was seen in the ER for this.  His BNP was very high.  So he was

admitted for heart failure exacerbation.  He has a history of ischemic

cardiomyopathy with reduced EF at about 20% to 25%.  On last evaluation of his

coronaries, he had nonrevascularizable coronary artery disease.  He came in.  He

denies any chest pain, tightness, pressure, only shortness of breath, which is

actually better since he has received some Lasix and has diuresed some.  He is

depressed secondary to having lost his wife in August due to COVID and losing his

son 2 weeks ago due to COVID as well. 



PAST MEDICAL HISTORY:  

1. Coronary artery disease status post bypass.

2. History of ischemic cardiomyopathy with last EF at 20% to 25%.

3. Type 2 diabetes.

4. Hyperlipidemia.

5. Hypertension.

6. PVD.



PAST SURGICAL HISTORY:  

1. Coronary about bypass grafting x5 in .  Only 2/5 grafts are open.

2. Cholecystectomy.

3. Right salivary gland tumor removal.

4. Coronary stenting in .

5. Aortobifemoral bypass.

6. TAVR in 2018 due to severe aortic stenosis.  Carotid endarterectomy in .



FAMILY HISTORY:  Father  at 85 of heart disease, mother at 81 of Alzheimer

dementia. 



SOCIAL HISTORY:  Former smoker, quit in .  [place]ises [place].



ALLERGIES:  NO KNOWN DRUG ALLERGIES.



OUTPATIENT MEDICATIONS:  Include

1. Entresto 24/26 p.o. b.i.d.

2. Crestor 10 mg p.o. q.p.m.

3. Pramipexole 0.25 mg b.i.d.

4. Glimepiride.

5. Gabapentin.

6. Furosemide 20 mg a day.

7. Finasteride 5 mg a day.

8. Jardiance 10 mg a day.

9. Diclofenac p.r.n.

10. Plavix 75 mg a day.

11. Coreg 3.125 b.i.d.

12. Aspirin 81 a day.



REVIEW OF SYSTEMS:  A 12-point review of systems was done and was found to be

negative other than stated in the history of present illness. 



PHYSICAL EXAMINATION:

VITAL SIGNS:  Temperature 97.8, pulse 92, respiratory rate 16, saturating 93% on

room air, blood pressure 118/60. 

GENERAL:  Awake, alert, oriented x3.  No distress. 

HEENT:  Normocephalic, atraumatic. 

NECK:  Supple. 

LUNGS:  Have mild crackles at bases. 

CARDIOVASCULAR:  S1 and S2.  There is a grade 2/6 systolic murmur at the right upper

sternal border. 

ABDOMEN:  Soft. 

EXTREMITIES:  1+ edema. 

SKIN:  Warm and dry.



LABORATORY DATA:  Laboratory work was reviewed.  White count of 6, hemoglobin of

10.9, hematocrit of 38.3, platelet count of 112.  Chemistries were reviewed.  His

troponin was 0.20, 0.31, 0.32 with normal CK-MB.  COVID PCR serology was not

detected. 



Most recent echocardiogram done earlier today showed EF of 15% to 20% with

hypokinesis of the RV TAVR, is normal functioning.  Mild to moderate TR and mild to

moderate PI.  Diastolic dysfunction is grade 3/3. 



ASSESSMENT:  

1. Acute on chronic systolic heart failure.

2. Ischemic cardiomyopathy with EF at 15% to 20%.

3. Severe aortic stenosis, status post TAVR, normal functioning on echo today.

4. Non-ST elevation myocardial infarction.



PLAN:  

1. Agree with continued IV diuresis.  Elevated liver function test is likely related

to either a viral issue versus just a liver congestion from heart failure. 

2. Agree with full anticoagulation for now as he does have severe coronary artery

disease, however, more than likely this is demand ischemia from his current

situation.  He is having nausea and vomiting and diarrhea and his hepatitis A titers

are equivocal, so this may be an acute hepatitis A, unclear at this time.  Continue

Lovenox full dose. 

3. We will increase Lasix to 40 IV b.i.d. 

Thank you for letting us participate in the care of your patient.  We will follow.







Job ID:  [id number]

## 2021-02-02 LAB
ALBUMIN SERPL BCG-MCNC: 3.2 G/DL (ref 3.4–4.8)
ALP SERPL-CCNC: 98 U/L (ref 40–110)
ALT SERPL W P-5'-P-CCNC: 437 U/L (ref 8–55)
ANION GAP SERPL CALC-SCNC: 11 MMOL/L (ref 10–20)
AST SERPL-CCNC: 257 U/L (ref 5–34)
BASOPHILS # BLD AUTO: 0 THOU/UL (ref 0–0.2)
BASOPHILS NFR BLD AUTO: 0.8 % (ref 0–1)
BILIRUB SERPL-MCNC: 2.4 MG/DL (ref 0.2–1.2)
BUN SERPL-MCNC: 25 MG/DL (ref 8.4–25.7)
CALCIUM SERPL-MCNC: 8.6 MG/DL (ref 7.8–10.44)
CHLORIDE SERPL-SCNC: 100 MMOL/L (ref 98–107)
CO2 SERPL-SCNC: 31 MMOL/L (ref 23–31)
CREAT CL PREDICTED SERPL C-G-VRATE: 95 ML/MIN (ref 70–130)
EOSINOPHIL # BLD AUTO: 0.1 THOU/UL (ref 0–0.7)
EOSINOPHIL NFR BLD AUTO: 2.2 % (ref 0–10)
GLOBULIN SER CALC-MCNC: 3 G/DL (ref 2.4–3.5)
GLUCOSE SERPL-MCNC: 138 MG/DL (ref 83–110)
HGB BLD-MCNC: 11.2 G/DL (ref 14–18)
LYMPHOCYTES # BLD: 1.4 THOU/UL (ref 1.2–3.4)
LYMPHOCYTES NFR BLD AUTO: 27.9 % (ref 21–51)
MCH RBC QN AUTO: 22.5 PG (ref 27–31)
MCV RBC AUTO: 77.2 FL (ref 78–98)
MDIFF COMPLETE?: YES
MICROCYTES BLD QL SMEAR: (no result) (100X)
MONOCYTES # BLD AUTO: 0.7 THOU/UL (ref 0.11–0.59)
MONOCYTES NFR BLD AUTO: 13.5 % (ref 0–10)
NEUTROPHILS # BLD AUTO: 2.7 THOU/UL (ref 1.4–6.5)
NEUTROPHILS NFR BLD AUTO: 55.6 % (ref 42–75)
PLATELET # BLD AUTO: 111 THOU/UL (ref 130–400)
POLYCHROMASIA BLD QL SMEAR: (no result) (100X)
POTASSIUM SERPL-SCNC: 3.6 MMOL/L (ref 3.5–5.1)
RBC # BLD AUTO: 4.98 MILL/UL (ref 4.7–6.1)
SODIUM SERPL-SCNC: 138 MMOL/L (ref 136–145)
TARGETS BLD QL SMEAR: (no result) (100X)
WBC # BLD AUTO: 4.9 THOU/UL (ref 4.8–10.8)

## 2021-02-02 RX ADMIN — ASPIRIN 81 MG CHEWABLE TABLET SCH MG: 81 TABLET CHEWABLE at 08:29

## 2021-02-02 NOTE — PDOC.CPN
- Subjective


Date: 02/02/21


Time: 12:18


Interval history: 





He feels better today. No chest pain. SOB only with exertion now. 





- Review of Systems


General: denies: fever/chills, weight/appetite/sleep changes, night sweats, 

fatigue


Respiratory: reports: shortness of breath, exercise intolerance.  denies: cough,

congestion


Cardiovascular: denies: chest pain, palpitation, edema, paroxysmal nocturnal 

dyspnea, orthopnea


Gastrointestinal: denies: nausea, vomiting, diarrhea, constipation, abd pain, GI

bleeding


Musculoskeletal: denies: pain, tenderness, stiffness, swelling, 

arthritis/arthralgias


Neurological: denies: numbness, syncope, seizure, weakness





- Objective


Allergies/Adverse Reactions: 


                                    Allergies











Allergy/AdvReac Type Severity Reaction Status Date / Time


 


No Known Allergies Allergy   Verified 01/31/21 21:06











Visit Medications: 


                               Current Medications





Acetaminophen (Acetaminophen 325 Mg Tab)  650 mg PO Q4H PRN


   PRN Reason: Headache/Fever/Mild Pain (1-3)


Aspirin (Aspirin Chewable 81 Mg Tab)  81 mg PO DAILY [facility]


   Last Admin: 02/02/21 08:29 Dose:  81 mg


   Documented by: 


Carvedilol (Carvedilol 3.125 Mg Tab)  3.125 mg PO BID-[facility]


   Last Admin: 02/02/21 08:29 Dose:  3.125 mg


   Documented by: 


Clopidogrel Bisulfate (Clopidogrel Bisulfate 75 Mg Tab)  75 mg PO DAILY [facility]


   Last Admin: 02/02/21 08:28 Dose:  75 mg


   Documented by: 


Dextrose/Water (Dextrose 50% Abboject 50 Ml Syringe)  25 gm SLOW IVP PRN PRN


   PRN Reason: Hypoglycemia


Enoxaparin Sodium (Enoxaparin Sodium 100 Mg/Ml Syringe)  100 mg SC 0900,2100 [facility]


   Last Admin: 02/02/21 08:28 Dose:  100 mg


   Documented by: 


Famotidine (Famotidine 20 Mg Tab)  20 mg PO BID [facility]


   Last Admin: 02/02/21 08:28 Dose:  20 mg


   Documented by: 


Finasteride (Finasteride 5 Mg Tab)  5 mg PO DAILY [facility]


   Last Admin: 02/02/21 08:28 Dose:  5 mg


   Documented by: 


Furosemide (Furosemide 40 Mg/4 Ml Vial)  40 mg SLOW IVP 0600,1400 [facility]


   Last Admin: 02/02/21 05:27 Dose:  40 mg


   Documented by: 


Gabapentin (Gabapentin 300 Mg Cap)  600 mg PO 0900,1200 [facility]


   Last Admin: 02/02/21 08:28 Dose:  600 mg


   Documented by: 


Gabapentin (Gabapentin 400 Mg Cap)  1,200 mg PO HS [facility]


   Last Admin: 02/01/21 21:08 Dose:  1,200 mg


   Documented by: 


Glucagon (Glucagon 1 Mg/Ml Vial)  1 mg IM PRN PRN


   PRN Reason: Hypoglycemia


Dextrose/Water (D5w)  1,000 mls @ 0 mls/hr IV .Q0M PRN


   PRN Reason: Hypoglycemia


Insulin Human Lispro (Humalog 300 Units/3 Ml Vial)  0 units SC .MILD SLIDING 

SCALE PRN


   PRN Reason: Mild Correctional Scale


Insulin Human Lispro (Humalog 300 Units/3 Ml Vial)  0 units SC .BEDTIME SLIDING 

SC PRN


   PRN Reason: Bedtime Correctional Scale


Pramipexole Dihydrochloride (Pramipexole Di-Hcl 0.25 Mg Tab)  0.25 mg PO BID [facility]


   Last Admin: 02/02/21 08:28 Dose:  0.25 mg


   Documented by: 


Rosuvastatin Calcium (Rosuvastatin 10 Mg Tab)  10 mg PO QPM [facility]


   Last Admin: 02/01/21 21:09 Dose:  10 mg


   Documented by: 


Sacubitril/Valsartan (Sacubitril 24mg/Valsartan 26mg Tab)  1 tab PO BID [facility]


   Last Admin: 02/02/21 08:29 Dose:  1 tab


   Documented by: 


Senna/Docusate Sodium (Senokot S 8.6-50 Mg Tab)  2 tab PO BIDPRN PRN


   PRN Reason: Constipation


Sodium Chloride (Flush - Normal Saline 10 Ml Syringe)  10 ml IVF PRN PRN


   PRN Reason: Saline Flush








Vital Signs & Weight: 


                                   Vital Signs











  Temp Pulse Resp BP Pulse Ox


 


 02/02/21 11:43  98.5 F  91  18  124/82  93 L


 


 02/02/21 07:10  98 F  86  16  119/72  93 L


 


 02/02/21 04:00  97.6 F  82  18  121/58 L  95








                                        











Weight                         231 lb 8 oz

















- Physical Exam


General: alert & oriented x3


HEENT: mucus membranes moist


Neck: supple neck


Cardiac: regular rate and rhythm


Lungs: normal breath sounds


Neuro: grossly intact


Abdomen: active bowel sounds


Extremities: 1+ LE edema


Skin: clear


Musculoskeletal: no pain





- Labs


Result Diagrams: 


                                 02/02/21 08:54





                                 02/02/21 08:54


                                  Troponin/CKMB











CK-MB (CK-2)  4.4 ng/mL (0-6.6)   01/31/21  16:33    


 


Troponin I  0.321 ng/mL (< 0.028)  H*  01/31/21  22:45    














- Telemetry


Sinus rhythms and dysrhythmias: sinus rhythm





- Assessment/Plan


Assessment/Plan: 





1. Acute on chronic systolic heart failure


2. Ischemic CM Ef at 15-20%


3. Severe AS s/p TAVR in 2018


4. NSTEMI





PLAN:


- Medical management for NSTEMI


- Still has ascitis, continue IV lasix for one more day and reassess tomorrow. 


- Continue full anticoagulation for medical treatment of NSTEMI for 48 hrs.

## 2021-02-02 NOTE — PDOC.HOSPP
- Subjective


Encounter Date: 02/02/21


Encounter Time: 17:38


Subjective: 


Mr. Grove was seen today in follow-up of CHF exacerbation. He does not have any 

complaints. He says he is going home tomorrow.





- Objective


Vital Signs & Weight: 


                             Vital Signs (12 hours)











  Temp Pulse Resp BP Pulse Ox


 


 02/02/21 16:50  97.3 F L  83  16  121/72  94 L


 


 02/02/21 11:43  98.5 F  91  18  124/82  93 L


 


 02/02/21 07:10  98 F  86  16  119/72  93 L








                                     Weight











Weight                         231 lb 8 oz














I&O: 


                                        











 02/01/21 02/02/21 02/03/21





 06:59 06:59 06:59


 


Intake Total 360 1154 


 


Output Total  550 


 


Balance 360 604 











Result Diagrams: 


                                 02/02/21 08:54





                                 02/02/21 08:54


Additional Labs: 


                                   Accuchecks











  02/02/21 02/02/21 02/02/21





  16:48 11:00 06:10


 


POC Glucose  117 H  198 H  107 H














  02/01/21





  20:54


 


POC Glucose  129 H














Hospitalist ROS





- Medication


Medications: 


Active Medications











Generic Name Dose Route Start Last Admin





  Trade Name Freq  PRN Reason Stop Dose Admin


 


Aspirin  81 mg  02/01/21 09:00  02/02/21 08:29





  Aspirin Chewable 81 Mg Tab  PO   81 mg





  DAILY CHON   Administration


 


Carvedilol  3.125 mg  02/01/21 08:00  02/02/21 17:16





  Carvedilol 3.125 Mg Tab  PO   3.125 mg





  BID- CHON   Administration


 


Clopidogrel Bisulfate  75 mg  02/01/21 09:00  02/02/21 08:28





  Clopidogrel Bisulfate 75 Mg Tab  PO   75 mg





  DAILY CHON   Administration


 


Enoxaparin Sodium  100 mg  02/01/21 09:00  02/02/21 08:28





  Enoxaparin Sodium 100 Mg/Ml Syringe  SC   100 mg





  0900,2100 CHON   Administration


 


Famotidine  20 mg  01/31/21 21:00  02/02/21 08:28





  Famotidine 20 Mg Tab  PO   20 mg





  BID CHON   Administration


 


Finasteride  5 mg  02/01/21 09:00  02/02/21 08:28





  Finasteride 5 Mg Tab  PO   5 mg





  DAILY CHON   Administration


 


Furosemide  40 mg  02/02/21 06:00  02/02/21 13:29





  Furosemide 40 Mg/4 Ml Vial  SLOW IVP   40 mg





  0600,1400 CHON   Administration


 


Gabapentin  600 mg  02/01/21 09:00  02/02/21 13:29





  Gabapentin 300 Mg Cap  PO   600 mg





  0900,1200 CHON   Administration


 


Gabapentin  1,200 mg  02/01/21 21:00  02/01/21 21:08





  Gabapentin 400 Mg Cap  PO   1,200 mg





  HS CHON   Administration


 


Pramipexole Dihydrochloride  0.25 mg  02/01/21 09:00  02/02/21 08:28





  Pramipexole Di-Hcl 0.25 Mg Tab  PO   0.25 mg





  BID CHON   Administration


 


Rosuvastatin Calcium  10 mg  02/01/21 21:00  02/01/21 21:09





  Rosuvastatin 10 Mg Tab  PO   10 mg





  QPM CHON   Administration


 


Sacubitril/Valsartan  1 tab  02/01/21 09:00  02/02/21 08:29





  Sacubitril 24mg/Valsartan 26mg Tab  PO   1 tab





  BID CHON   Administration














Hospitalist Exam


Vitals: 


                             Vital Signs (12 hours)











  Temp Pulse Resp BP Pulse Ox


 


 02/02/21 16:50  97.3 F L  83  16  121/72  94 L


 


 02/02/21 11:43  98.5 F  91  18  124/82  93 L


 


 02/02/21 07:10  98 F  86  16  119/72  93 L








                                     Weight











Weight                         231 lb 8 oz














Eye: PERRL, anicteric sclera


Heart: RRR, no murmur, no gallops, no rubs, normal peripheral pulses


Respiratory: CTAB, no wheezes, no rales, no ronchi, normal chest expansion


Gastrointestinal: soft, non-tender, non-distended, normal bowel sounds, no 

palpable masses, no hepatomegaly


Extremities: no cyanosis, no edema





Hosp A/P


(1) Acute on chronic systolic (congestive) heart failure


Code(s): I50.23 - ACUTE ON CHRONIC SYSTOLIC (CONGESTIVE) HEART FAILURE   Status:

Acute   





(2) PVD (peripheral vascular disease)


Code(s): I73.9 - PERIPHERAL VASCULAR DISEASE, UNSPECIFIED   Status: Acute   





(3) Coronary artery disease


Code(s): I25.10 - ATHSCL HEART DISEASE OF NATIVE CORONARY ARTERY W/O ANG PCTRS  

Status: Chronic   


Qualifiers: 


   Coronary Disease-Associated Artery/Lesion type: bypass graft   Native vs. 

transplanted heart: native heart   Associated angina: without angina   Qualified

Code(s): I25.810 - Atherosclerosis of coronary artery bypass graft(s) without 

angina pectoris   





(4) Hypertension


Code(s): I10 - ESSENTIAL (PRIMARY) HYPERTENSION   Status: Chronic   


Qualifiers: 


   Hypertension type: essential hypertension   Qualified Code(s): I10 - 

Essential (primary) hypertension   





- Plan





* Acute on chronic combined systolic and diastolic heart failure- discussed with

  ARMANDO Olvera- will continue to diurese


* HTN- blood pressure is stable


* CAD- patient has inoperable CAD according to Cardiologist- will treat 

  medically


* Elevated Liver enzymes- this is likely due to passive congestion of the liver 

  form CHF as well as Hepatitis A infection


* Hopefully home tomorrow

## 2021-02-03 VITALS — DIASTOLIC BLOOD PRESSURE: 69 MMHG | TEMPERATURE: 97.5 F | SYSTOLIC BLOOD PRESSURE: 116 MMHG

## 2021-02-03 RX ADMIN — ASPIRIN 81 MG CHEWABLE TABLET SCH MG: 81 TABLET CHEWABLE at 10:19

## 2021-02-03 NOTE — PDOC.CPN
- Subjective


Date: 21


Time: 16:02


Interval history: 





He is doing much better. breathing is better than it has been in along time. 





- Review of Systems


General: denies: fever/chills, weight/appetite/sleep changes, night sweats, 

fatigue


Respiratory: denies: cough, congestion, shortness of breath, exercise 

intolerance


Cardiovascular: denies: chest pain, palpitation, edema, paroxysmal nocturnal 

dyspnea, orthopnea


Gastrointestinal: denies: nausea, vomiting, diarrhea, constipation, abd pain, GI

bleeding


Musculoskeletal: denies: pain, tenderness, stiffness, swelling, 

arthritis/arthralgias


Neurological: denies: numbness, syncope, seizure, weakness





- Objective


Allergies/Adverse Reactions: 


                                    Allergies











Allergy/AdvReac Type Severity Reaction Status Date / Time


 


No Known Allergies Allergy   Verified 21 21:06











Visit Medications: 


                               Current Medications





Acetaminophen (Acetaminophen 325 Mg Tab)  650 mg PO Q4H PRN


   PRN Reason: Headache/Fever/Mild Pain (1-3)


Aspirin (Aspirin Chewable 81 Mg Tab)  81 mg PO DAILY [facility]


   Last Admin: 21 10:19 Dose:  81 mg


   Documented by: 


Carvedilol (Carvedilol 3.125 Mg Tab)  3.125 mg PO BID-WM [facility]


   Last Admin: 21 10:20 Dose:  3.125 mg


   Documented by: 


Clopidogrel Bisulfate (Clopidogrel Bisulfate 75 Mg Tab)  75 mg PO DAILY [facility]


   Last Admin: 21 10:20 Dose:  75 mg


   Documented by: 


Dextrose/Water (Dextrose 50% Abboject 50 Ml Syringe)  25 gm SLOW IVP PRN PRN


   PRN Reason: Hypoglycemia


Enoxaparin Sodium (Enoxaparin Sodium 100 Mg/Ml Syringe)  100 mg SC 0900,2100 [facility]


   Last Admin: 21 10:21 Dose:  100 mg


   Documented by: 


Famotidine (Famotidine 20 Mg Tab)  20 mg PO BID [facility]


   Last Admin: 21 10:18 Dose:  20 mg


   Documented by: 


Finasteride (Finasteride 5 Mg Tab)  5 mg PO DAILY [facility]


   Last Admin: 21 10:20 Dose:  5 mg


   Documented by: 


Furosemide (Furosemide 40 Mg/4 Ml Vial)  40 mg SLOW IVP 0600,1400 [facility]


   Last Admin: 21 14:57 Dose:  Not Given


   Documented by: 


Gabapentin (Gabapentin 300 Mg Cap)  600 mg PO 0900,1200 [facility]


   Last Admin: 21 12:28 Dose:  600 mg


   Documented by: 


Gabapentin (Gabapentin 400 Mg Cap)  1,200 mg PO HS [facility]


   Last Admin: 21 21:01 Dose:  1,200 mg


   Documented by: 


Glucagon (Glucagon 1 Mg/Ml Vial)  1 mg IM PRN PRN


   PRN Reason: Hypoglycemia


Dextrose/Water (D5w)  1,000 mls @ 0 mls/hr IV .Q0M PRN


   PRN Reason: Hypoglycemia


Insulin Human Lispro (Humalog 300 Units/3 Ml Vial)  0 units SC .MILD SLIDING 

SCALE PRN


   PRN Reason: Mild Correctional Scale


Insulin Human Lispro (Humalog 300 Units/3 Ml Vial)  0 units SC .BEDTIME SLIDING 

SC PRN


   PRN Reason: Bedtime Correctional Scale


Pramipexole Dihydrochloride (Pramipexole Di-Hcl 0.25 Mg Tab)  0.25 mg PO BID [facility]


   Last Admin: 21 10:20 Dose:  0.25 mg


   Documented by: 


Rosuvastatin Calcium (Rosuvastatin 10 Mg Tab)  10 mg PO QPM [facility]


   Last Admin: 21 21:05 Dose:  10 mg


   Documented by: 


Sacubitril/Valsartan (Sacubitril 24mg/Valsartan 26mg Tab)  1 tab PO BID [facility]


   Last Admin: 21 10:19 Dose:  1 tab


   Documented by: 


Senna/Docusate Sodium (Senokot S 8.6-50 Mg Tab)  2 tab PO BIDPRN PRN


   PRN Reason: Constipation


Sodium Chloride (Flush - Normal Saline 10 Ml Syringe)  10 ml IVF PRN PRN


   PRN Reason: Saline Flush








Vital Signs & Weight: 


                                   Vital Signs











  Temp Pulse Resp BP BP Pulse Ox


 


 21 12:30  97.5 F L  81  16  116/69   96


 


 21 07:10  97.4 F L  81  16   121/58 L  93 L








                                        











Weight                         230 lb

















- Physical Exam


General: alert & oriented x3


HEENT: mucus membranes moist


Neck: supple neck


Cardiac: regular rate and rhythm


Lungs: normal breath sounds


Neuro: grossly intact


Abdomen: active bowel sounds


Extremities: 1+ LE edema


Skin: clear


Musculoskeletal: no pain





- Labs


Result Diagrams: 


                                 21 08:54





                                 21 08:54


                                  Troponin/CKMB











CK-MB (CK-2)  4.4 ng/mL (0-6.6)   21  16:33    


 


Troponin I  0.321 ng/mL (< 0.028)  H*  21  22:45    














- Telemetry


Sinus rhythms and dysrhythmias: sinus rhythm





- Assessment/Plan


Assessment/Plan: 





1. Acute on chronic systolic heart failure


2. Ischemic CM EF at 15-20%


3. Severe AS s/p TAVR in 2018


4. NSTEMI





PLAN:


- Medical management for NSTEMI


- Doing a lot better.


- He can be discharged on current meds. Likely exacerbation due to non 

compliance he states he is now thinking he had been using his Lasix as he has 

been preoccupied with  arrangements.

## 2021-02-03 NOTE — PDOC.DS.DS
Provider


Date of Admission: 


02/01/21 13:47





Date of Discharge: 02/03/21


Admitting Provider: 


Sima Braun MD





Consultations: Cardiology


Primary Care Physician: 


Soraya Rose MD








Course


Hospital Course: 


Mr. Schuster is a 75-year-old gentleman that has a history of chronic systolic 

heart failure as well as coronary artery disease.  He presented to the emergency

room complaining of nausea vomiting and diarrhea as well as increasing abdominal

girth.  He noticed some difficulty in breathing as well which she attributed to 

the increased size of his abdomen.  He was evaluated in the emergency room and 

transferred to our hospital from [place].  There it was determined that he had an

elevated BMP much higher than previous a CT scan of the abdomen showed a 

moderate amount of ascites as well as anasarca.  Troponin was also slightly 

elevated.  And then he was transferred to our facility for congestive heart 

failure exacerbation.  A repeat echocardiogram was done during this admission 

and was determined that he had a much lower ejection fraction than prior his 

ejection fraction was estimated between 15 and 20%.  He was evaluated by his 

cardiologist Dr. Jimenez.  He was diuresed during his hospital stay and was 

asking to go home.  He recently had 2 deaths in the family his wife as well as 

his son to COVID-19 and was not interested in staying in the hospital or any 

invasive procedures during this hospital stay.  Once he was stabilized he was 

able to be discharged home and to have close outpatient follow-up.


Pertinent Studies: 





Echocardiogram


Resuscitation Status: 








01/31/21 22:21


Resuscitation Status Routine 


   Co-Sign Provider: 


   Resuscitation Status: FULL: Full Resuscitation


   Discussed with: Patient


   Additional comments: He told hospitalist NP he wanted to be a DNAR while in 

ED and has changed his mind and


                          now is okay with CPR per nurse so will change it to 

full code. Palliative care consult


                          pending to assist with goals of care and grief.








Lab Results: 


                                        





                                 02/02/21 08:54 





                                 02/02/21 08:54 





Abnormal Lab Results - Last 48 hrs





02/02/21 08:54: Total Bilirubin 2.4 H,  H,  H, Albumin 3.2 L, 

Albumin/Globulin Ratio 1.1 L


02/02/21 08:54: Hgb 11.2 L, Hct 38.4 L, MCV 77.2 L, MCH 22.5 L, MCHC 29.1 L, RDW

18.4 H, Plt Count 111 L, MPV 11.5 H, Monocytes % 13.5 H, Monocytes # 0.7 H, 

Hypochromia MODERATE=16-30 cells H, Plt Morphology Comment Appears Decreased L, 

Polychromasia MODERATE = 3-4 cells H








Vitals: 


                             Vital Signs (12 hours)











  Temp Pulse Resp BP BP Pulse Ox


 


 02/03/21 12:30  97.5 F L  81  16  116/69   96


 


 02/03/21 07:10  97.4 F L  81  16   121/58 L  93 L








                                     Weight











Weight                         230 lb














Physical Exam: 


The patient was seen and examined on the day of discharge.








Problem


(1) Acute on chronic systolic (congestive) heart failure


Code(s): I50.23 - ACUTE ON CHRONIC SYSTOLIC (CONGESTIVE) HEART FAILURE   Status:

Acute   





(2) PVD (peripheral vascular disease)


Code(s): I73.9 - PERIPHERAL VASCULAR DISEASE, UNSPECIFIED   Status: Acute   





(3) Coronary artery disease


Code(s): I25.10 - ATHSCL HEART DISEASE OF NATIVE CORONARY ARTERY W/O ANG PCTRS  

Status: Chronic   


Qualifiers: 


   Coronary Disease-Associated Artery/Lesion type: bypass graft   Native vs. 

transplanted heart: native heart   Associated angina: without angina   Qualified

Code(s): I25.810 - Atherosclerosis of coronary artery bypass graft(s) without 

angina pectoris   





(4) Hypertension


Code(s): I10 - ESSENTIAL (PRIMARY) HYPERTENSION   Status: Chronic   


Qualifiers: 


   Hypertension type: essential hypertension   Qualified Code(s): I10 - 

Essential (primary) hypertension   





Plan


Home Medications: 


                                        











 Medication  Instructions  Recorded  Confirmed  Type


 


Gabapentin 600 mg PO ASDIR 08/11/17 01/31/21 History


 


Pramipexole Di-HCl [Pramipexole 0.25 mg PO BID 08/11/17 01/31/21 History





Dihydrochloride]    


 


Rosuvastatin Calcium 10 mg PO QPM 08/11/17 01/31/21 History


 


Clopidogrel Bisulfate [Plavix] 75 mg PO DAILY 08/14/17 01/31/21 History


 


Gabapentin 1,200 mg PO HS 01/03/18 01/31/21 History


 


Finasteride 5 mg PO DAILY 07/25/20 01/31/21 History


 


Sacubitril/Valsartan [Entresto 24 1 tab PO BID 07/25/20 01/31/21 History





mg-26 mg Tablet]    


 


Aspirin Chewable [Aspirin Chewable 81 mg PO DAILY 10/05/20 01/31/21 History





Tablet]    


 


Carvedilol [Coreg] 3.125 mg PO BID 10/05/20 01/31/21 History


 


Empagliflozin [Jardiance] 10 mg PO DAILY 10/05/20 01/31/21 History


 


Diclofenac Sodium 75 mg PO BID 01/31/21 01/31/21 History


 


Furosemide 20 mg PO DAILY 01/31/21 01/31/21 History


 


Glimepiride [Amaryl] 4 mg PO QAM-WM 01/31/21 01/31/21 History











Allergies: 








No Known Allergies Allergy (Verified 01/31/21 21:06)


   





Activity:: Activity as Tolerated


Nourishment:: Heart Healthy Diet


Referrals: 


Chuy Olvera MD [Active] - 2-3 Weeks (Please call to schedule a follow up 

appontment.)


Soraya Rose MD [Primary Care Provider] - 02/08/21 11:10 am


()


Disposition: HOME





Quality


CORE MEASURES:: N/A

## 2021-02-06 ENCOUNTER — HOSPITAL ENCOUNTER (EMERGENCY)
Dept: HOSPITAL 92 - ERS | Age: 76
Discharge: HOME | End: 2021-02-06
Payer: MEDICARE

## 2021-02-06 DIAGNOSIS — E78.00: ICD-10-CM

## 2021-02-06 DIAGNOSIS — I11.0: ICD-10-CM

## 2021-02-06 DIAGNOSIS — Z79.899: ICD-10-CM

## 2021-02-06 DIAGNOSIS — I50.9: ICD-10-CM

## 2021-02-06 DIAGNOSIS — J20.9: Primary | ICD-10-CM

## 2021-02-06 DIAGNOSIS — R73.03: ICD-10-CM

## 2021-02-06 DIAGNOSIS — Z79.82: ICD-10-CM

## 2021-02-06 LAB
ALBUMIN SERPL BCG-MCNC: 3.5 G/DL (ref 3.4–4.8)
ALP SERPL-CCNC: 84 U/L (ref 40–110)
ALT SERPL W P-5'-P-CCNC: 188 U/L (ref 8–55)
ANION GAP SERPL CALC-SCNC: 17 MMOL/L (ref 10–20)
ANISOCYTOSIS BLD QL SMEAR: (no result) (100X)
AST SERPL-CCNC: 50 U/L (ref 5–34)
BASOPHILS # BLD AUTO: 0 THOU/UL (ref 0–0.2)
BASOPHILS NFR BLD AUTO: 0.4 % (ref 0–1)
BILIRUB SERPL-MCNC: 2.5 MG/DL (ref 0.2–1.2)
BUN SERPL-MCNC: 26 MG/DL (ref 8.4–25.7)
CALCIUM SERPL-MCNC: 8.7 MG/DL (ref 7.8–10.44)
CHLORIDE SERPL-SCNC: 100 MMOL/L (ref 98–107)
CK MB SERPL-MCNC: 2.8 NG/ML (ref 0–6.6)
CO2 SERPL-SCNC: 25 MMOL/L (ref 23–31)
CREAT CL PREDICTED SERPL C-G-VRATE: 0 ML/MIN (ref 70–130)
EOSINOPHIL # BLD AUTO: 0 THOU/UL (ref 0–0.7)
EOSINOPHIL NFR BLD AUTO: 0.5 % (ref 0–10)
GLOBULIN SER CALC-MCNC: 3 G/DL (ref 2.4–3.5)
GLUCOSE SERPL-MCNC: 100 MG/DL (ref 83–110)
HGB BLD-MCNC: 10.5 G/DL (ref 14–18)
LYMPHOCYTES # BLD: 1.3 THOU/UL (ref 1.2–3.4)
LYMPHOCYTES NFR BLD AUTO: 23.2 % (ref 21–51)
MCH RBC QN AUTO: 22.9 PG (ref 27–31)
MCV RBC AUTO: 76.4 FL (ref 78–98)
MDIFF COMPLETE?: YES
MONOCYTES # BLD AUTO: 0.6 THOU/UL (ref 0.11–0.59)
MONOCYTES NFR BLD AUTO: 10.6 % (ref 0–10)
NEUTROPHILS # BLD AUTO: 3.7 THOU/UL (ref 1.4–6.5)
NEUTROPHILS NFR BLD AUTO: 65.3 % (ref 42–75)
PLATELET # BLD AUTO: 98 THOU/UL (ref 130–400)
POLYCHROMASIA BLD QL SMEAR: (no result) (100X)
POTASSIUM SERPL-SCNC: 4.6 MMOL/L (ref 3.5–5.1)
RBC # BLD AUTO: 4.58 MILL/UL (ref 4.7–6.1)
SODIUM SERPL-SCNC: 137 MMOL/L (ref 136–145)
TARGETS BLD QL SMEAR: (no result) (100X)
WBC # BLD AUTO: 5.6 THOU/UL (ref 4.8–10.8)

## 2021-02-06 PROCEDURE — 83880 ASSAY OF NATRIURETIC PEPTIDE: CPT

## 2021-02-06 PROCEDURE — 84484 ASSAY OF TROPONIN QUANT: CPT

## 2021-02-06 PROCEDURE — 93005 ELECTROCARDIOGRAM TRACING: CPT

## 2021-02-06 PROCEDURE — 71045 X-RAY EXAM CHEST 1 VIEW: CPT

## 2021-02-06 PROCEDURE — 85025 COMPLETE CBC W/AUTO DIFF WBC: CPT

## 2021-02-06 PROCEDURE — 82553 CREATINE MB FRACTION: CPT

## 2021-02-06 PROCEDURE — 80053 COMPREHEN METABOLIC PANEL: CPT

## 2021-02-06 PROCEDURE — 71275 CT ANGIOGRAPHY CHEST: CPT

## 2021-02-06 NOTE — RAD
Chest AP view



INDICATION: Heart failure and edema with syncopal episode last evening



COMPARISON: January 31, 2021 chest radiograph



FINDINGS:



Lungs:  There is perihilar interstitial and airspace edema.



Cardiac silhouette:  There is moderate cardiomegaly



Pulmonary vasculature:  There is mild pulmonary vascular congestion.



Pleural spaces:  There are tiny bilateral pleural effusions, right greater than left.



Upper abdomen:  No abnormality seen.



Osseous structures:  No acute osseous abnormality.



Additional findings:  Post-CABG changes stable.



IMPRESSION: 

Mild CHF.



Reported By: Kike Soto 

Electronically Signed:  2/6/2021 10:49 AM

## 2021-02-06 NOTE — CT
CT PULMONARY ANGIOGRAM WITH IV CONTRAST AND 3D POSTPROCESSING:

 

Date:  02/06/2021

 

HISTORY:  

Syncopal episode. Difficulty breathing. Patient was discharged from hospital on 02/03/2021 for hypert
ension, heart failure, and edema. 

 

FINDINGS:

There is good contrast opacification of the pulmonary arterial vasculature without filling defects to
 suggest pulmonary embolism. There are vascular calcifications with a 5.8 cm aneurysm of the ascendin
g thoracic aorta. An aortic valvular stent is present. No pericardial effusions are identified. There
 are moderate sized bilateral pleural effusions, right side larger than the left. No pneumothoraces o
r lobar consolidation seen. There are degenerative changes in the spine. Upper abdominal tomograms de
monstrate a small amount of free fluid in the upper abdomen. 

 

IMPRESSION: 

1.  No CT evidence of pulmonary embolism. 

2.  Moderate size bilateral pleural effusions. 

3.  A 5.8 cm ascending thoracic aortic aneurysm. 

4.  Mild ascites. 

 

 

POS: [facility]

## 2021-02-14 ENCOUNTER — HOSPITAL ENCOUNTER (INPATIENT)
Dept: HOSPITAL 92 - ERS | Age: 76
LOS: 2 days | Discharge: HOME | DRG: 293 | End: 2021-02-16
Attending: INTERNAL MEDICINE | Admitting: INTERNAL MEDICINE
Payer: MEDICARE

## 2021-02-14 VITALS — BODY MASS INDEX: 29.9 KG/M2

## 2021-02-14 DIAGNOSIS — Z90.49: ICD-10-CM

## 2021-02-14 DIAGNOSIS — E11.51: ICD-10-CM

## 2021-02-14 DIAGNOSIS — Z95.5: ICD-10-CM

## 2021-02-14 DIAGNOSIS — Z83.1: ICD-10-CM

## 2021-02-14 DIAGNOSIS — Z95.828: ICD-10-CM

## 2021-02-14 DIAGNOSIS — I11.0: Primary | ICD-10-CM

## 2021-02-14 DIAGNOSIS — E78.00: ICD-10-CM

## 2021-02-14 DIAGNOSIS — Z79.82: ICD-10-CM

## 2021-02-14 DIAGNOSIS — I25.10: ICD-10-CM

## 2021-02-14 DIAGNOSIS — Z20.822: ICD-10-CM

## 2021-02-14 DIAGNOSIS — I50.23: ICD-10-CM

## 2021-02-14 DIAGNOSIS — Z79.02: ICD-10-CM

## 2021-02-14 DIAGNOSIS — Z79.899: ICD-10-CM

## 2021-02-14 DIAGNOSIS — E11.40: ICD-10-CM

## 2021-02-14 DIAGNOSIS — E78.5: ICD-10-CM

## 2021-02-14 DIAGNOSIS — Z79.84: ICD-10-CM

## 2021-02-14 DIAGNOSIS — Z95.1: ICD-10-CM

## 2021-02-14 DIAGNOSIS — Z95.2: ICD-10-CM

## 2021-02-14 LAB
ALBUMIN SERPL BCG-MCNC: 3.3 G/DL (ref 3.4–4.8)
ALP SERPL-CCNC: 75 U/L (ref 40–110)
ALT SERPL W P-5'-P-CCNC: 44 U/L (ref 8–55)
ANION GAP SERPL CALC-SCNC: 13 MMOL/L (ref 10–20)
AST SERPL-CCNC: 22 U/L (ref 5–34)
BASOPHILS # BLD AUTO: 0 THOU/UL (ref 0–0.2)
BASOPHILS NFR BLD AUTO: 0.6 % (ref 0–1)
BILIRUB SERPL-MCNC: 2.4 MG/DL (ref 0.2–1.2)
BUN SERPL-MCNC: 25 MG/DL (ref 8.4–25.7)
CALCIUM SERPL-MCNC: 8.6 MG/DL (ref 7.8–10.44)
CHLORIDE SERPL-SCNC: 105 MMOL/L (ref 98–107)
CK MB SERPL-MCNC: 3 NG/ML (ref 0–6.6)
CK SERPL-CCNC: 70 U/L (ref 30–200)
CO2 SERPL-SCNC: 28 MMOL/L (ref 23–31)
CREAT CL PREDICTED SERPL C-G-VRATE: 0 ML/MIN (ref 70–130)
EOSINOPHIL # BLD AUTO: 0.1 THOU/UL (ref 0–0.7)
EOSINOPHIL NFR BLD AUTO: 1.2 % (ref 0–10)
GLOBULIN SER CALC-MCNC: 3 G/DL (ref 2.4–3.5)
GLUCOSE SERPL-MCNC: 96 MG/DL (ref 83–110)
HGB BLD-MCNC: 10 G/DL (ref 14–18)
LEUKOCYTE ESTERASE UR QL STRIP.AUTO: 250 LEU/UL
LYMPHOCYTES # BLD: 1.7 THOU/UL (ref 1.2–3.4)
LYMPHOCYTES NFR BLD AUTO: 34.1 % (ref 21–51)
MCH RBC QN AUTO: 22.1 PG (ref 27–31)
MCV RBC AUTO: 76.4 FL (ref 78–98)
MONOCYTES # BLD AUTO: 0.6 THOU/UL (ref 0.11–0.59)
MONOCYTES NFR BLD AUTO: 11.9 % (ref 0–10)
NEUTROPHILS # BLD AUTO: 2.6 THOU/UL (ref 1.4–6.5)
NEUTROPHILS NFR BLD AUTO: 52.2 % (ref 42–75)
PLATELET # BLD AUTO: 136 THOU/UL (ref 130–400)
POTASSIUM SERPL-SCNC: 4.6 MMOL/L (ref 3.5–5.1)
RBC # BLD AUTO: 4.54 MILL/UL (ref 4.7–6.1)
RBC UR QL AUTO: (no result) HPF (ref 0–3)
SODIUM SERPL-SCNC: 141 MMOL/L (ref 136–145)
SP GR UR STRIP: 1.02 (ref 1–1.04)
TROPONIN I SERPL DL<=0.01 NG/ML-MCNC: 0.03 NG/ML (ref ?–0.03)
TROPONIN I SERPL DL<=0.01 NG/ML-MCNC: 0.03 NG/ML (ref ?–0.03)
WBC # BLD AUTO: 5 THOU/UL (ref 4.8–10.8)
WBC UR QL AUTO: (no result) HPF (ref 0–3)

## 2021-02-14 PROCEDURE — 80053 COMPREHEN METABOLIC PANEL: CPT

## 2021-02-14 PROCEDURE — 83036 HEMOGLOBIN GLYCOSYLATED A1C: CPT

## 2021-02-14 PROCEDURE — 80048 BASIC METABOLIC PNL TOTAL CA: CPT

## 2021-02-14 PROCEDURE — 87635 SARS-COV-2 COVID-19 AMP PRB: CPT

## 2021-02-14 PROCEDURE — 82553 CREATINE MB FRACTION: CPT

## 2021-02-14 PROCEDURE — 36416 COLLJ CAPILLARY BLOOD SPEC: CPT

## 2021-02-14 PROCEDURE — 83605 ASSAY OF LACTIC ACID: CPT

## 2021-02-14 PROCEDURE — 87040 BLOOD CULTURE FOR BACTERIA: CPT

## 2021-02-14 PROCEDURE — U0005 INFEC AGEN DETEC AMPLI PROBE: HCPCS

## 2021-02-14 PROCEDURE — 85025 COMPLETE CBC W/AUTO DIFF WBC: CPT

## 2021-02-14 PROCEDURE — 81003 URINALYSIS AUTO W/O SCOPE: CPT

## 2021-02-14 PROCEDURE — 71045 X-RAY EXAM CHEST 1 VIEW: CPT

## 2021-02-14 PROCEDURE — 36415 COLL VENOUS BLD VENIPUNCTURE: CPT

## 2021-02-14 PROCEDURE — U0003 INFECTIOUS AGENT DETECTION BY NUCLEIC ACID (DNA OR RNA); SEVERE ACUTE RESPIRATORY SYNDROME CORONAVIRUS 2 (SARS-COV-2) (CORONAVIRUS DISEASE [COVID-19]), AMPLIFIED PROBE TECHNIQUE, MAKING USE OF HIGH THROUGHPUT TECHNOLOGIES AS DESCRIBED BY CMS-2020-01-R: HCPCS

## 2021-02-14 PROCEDURE — 84484 ASSAY OF TROPONIN QUANT: CPT

## 2021-02-14 PROCEDURE — 83735 ASSAY OF MAGNESIUM: CPT

## 2021-02-14 PROCEDURE — 81015 MICROSCOPIC EXAM OF URINE: CPT

## 2021-02-14 PROCEDURE — 96374 THER/PROPH/DIAG INJ IV PUSH: CPT

## 2021-02-14 PROCEDURE — 83880 ASSAY OF NATRIURETIC PEPTIDE: CPT

## 2021-02-14 PROCEDURE — 87086 URINE CULTURE/COLONY COUNT: CPT

## 2021-02-14 PROCEDURE — 82550 ASSAY OF CK (CPK): CPT

## 2021-02-14 PROCEDURE — 93005 ELECTROCARDIOGRAM TRACING: CPT

## 2021-02-14 NOTE — HP
PRIMARY CARE PHYSICIAN:  Dr. Sandoval.



CARDIOLOGIST:  Chuy Olvera MD.



CHIEF COMPLAINT:  Edema.



HISTORY OF PRESENT ILLNESS:  Mr. Grove is a 75-year-old man, who reported to the

emergency room today for worsening lower extremity swelling that extends all the way

up to his scrotum.  He states that this has progressively gotten worse over the last

three days.  He reports that he is more short of breath when he exerts himself such

as climbing on a tractor and when he lies flat.  He denied any chest pain, abdominal

pain, nausea, vomiting, diarrhea.  He was admitted by this NP two weeks ago for

nausea, vomiting, feeling bloated in his abdomen and elevated troponin.  We repeated

the echocardiogram on this last visit on 2021, which showed an ejection

fraction of 15% to 20% with global hypokinesis, increased RVSP, C/W moderate

pulmonary hypertension, left ventricular size moderately increased, restrictive

filling pattern, severe diastolic dysfunction.  He does have a TAVR from 2018, which

was functioning normally.  He was discharged several days later after Dr. Olvera saw

him while he was in the hospital and thought that the CHF exacerbation on that visit

was due to primarily preoccupation with recent  arrangements and he had

forgotten to take his Lasix.  The patient on this visit reports that he has been

taking his Lasix, but does not believe it is really working and is very frustrated

that he has had multiple visits in the last several weeks for this edema.  Lab

values in the emergency room show white blood cell count of 5, hemoglobin 10,

hematocrit 34.7, and platelet count is 136.  Chemistry panel largely unremarkable.

He had a slightly elevated troponin, but this was significantly better than when he

was on his last admission.  BNP was 3814, which is slightly higher than it was on

the last two admissions.  Urine with a glucose, urobilirubin, leukocyte esterase,

white blood cell count, but no bacteria seen.  This was sent off for culture.  He

was given 40 of Lasix in the emergency room.  He is able to pee some of that and

does feel better.  He is going to be admitted to telemetry for further management. 



REVIEW OF SYSTEMS:  Does report dyspnea on exertion.  Reports orthopnea.  Denies any

chest pain, nausea, vomiting, diarrhea.  All systems are reviewed and are negative

unless mentioned above or in HPI.  It does report musculoskeletal edema that has

been worsening over the last three days and he also got some scrotal edema. 



ALLERGIES:  NONE.



HOME MEDICATIONS:  Per last admission;

1. Amaryl 4 mg p.o. q.a.m.

2. Aspirin 81 mg p.o. daily.

3. Coreg 3.125 mg p.o. b.i.d.

4. Diclofenac 75 mg p.o. b.i.d.

5. Entresto , one tablet p.o. b.i.d.

6. Finasteride 5 mg p.o. daily.

7. Furosemide 20 mg p.o. daily.

8. Gabapentin he takes 600 mg in the morning and 1200 mg at night.

9. Jardiance 10 mg p.o. daily.

10. Plavix 75 mg p.o. daily.

11. Pramipexole 0.25 mg p.o. b.i.d.

12. Crestor 10 mg q.p.m.



PAST MEDICAL HISTORY:  Congestive heart failure, diabetes, high cholesterol,

hypertension. 



SURGICAL HISTORY:  Cardiac stent x2, coronary artery bypass x5 vessel.  He has had a

TAVR, cholecystectomy, left leg bypass. 



PSYCHIATRIC HISTORY:  None.



SOCIAL HISTORY:  He drinks alcohol less than five drinks per day.  Denies any drug

use.  No smoking history. 



FAMILY HISTORY:  It is noted that he lost his son several weeks ago to COVID and

lost his wife last August to COVID. 



EKG in the emergency room shows sinus rhythm with first-degree AV block, left axis

deviation, beats per minute 85.  He also had a chest x-ray which shows cardiomegaly

without any evidence of congestive heart failure. 



PHYSICAL EXAMINATION:

VITAL SIGNS:  Blood pressure 121/69, pulse is 82, respiratory rate is 18,

temperature is 98, PO2 sats are 94% on room air. 

CONSTITUTIONAL:  The patient is awake and alert, oriented x3.  He appears generally

uncomfortable. 

HEENT:  Mouth exam is normal.  Mucous membranes are moist. 

NECK:  Supple.  Normal range of motion.  Moderately increased JVD. 

RESPIRATORY:  Scattered bibasilar rales, mid lung and he uses full sentences without

any accessory muscle use.  He is in no evidence of any respiratory distress. 

CARDIOVASCULAR:  Regular rate and rhythm.  No murmurs are noted. 

ABDOMEN:  Soft and nontender. 

:  +4 edema to the scrotum and foreskin.  Mild erythema and edema to bilateral

lower legs.  No palpable warmth. 

EXTREMITIES:  Pulses are present in all four extremities, +3 to bilateral lower leg

edema. 

NEUROLOGIC:  II through XII are grossly intact. 

SKIN:  Warm, dry.  Normal in color other than what is mentioned above due to the

edema. 

PSYCHIATRIC:  Has a normal affect.



PLAN/ASSESSMENT:  

1. Congestive heart failure exacerbation with anasarca.  The patient received 40 mg

of Lasix in the emergency room.  We will continue this b.i.d.  We will trend

troponins.  The first one was in the indeterminate range.  He had an echocardiogram

done two weeks ago.  I do not see a reason to repeat it. 

2. History of coronary artery disease.  We will continue his home medications.

3. History of hypertension.  We will continue his home medications once reconciled.

4. History of neuropathy.  Continue his home medications once reconciled.

5. History of hyperlipidemia.  We will restart his statin.

6. History of borderline diabetes Accu-Cheks a.c. and at bedtime with mild sliding

scale for coverage. 

7. Lovenox 40 mg daily for DVT prevention, Pepcid 20 mg p.o. b.i.d. for PED

prevention.  Case discussed with  __________. 

8. Hospital course dependent on clinical findings.







Job ID:  [id number]

## 2021-02-14 NOTE — RAD
Exam: Chest one view



HISTORY:Bilateral lower extremity swelling. CHF.



Comparison: 2/6/2021



FINDINGS:

Cardiac silhouette:Cardiomegaly. Stable sternotomy wires.

Aorta: Atherosclerosis

Pulmonary vessels: Normal

Costophrenic angles: Clear



LUNGS: No masses or consolidation. Chronic lung parenchymal changes.



Pneumothorax: None



Osseous abnormalities: None



IMPRESSION: Cardiomegaly without evidence of congestive heart failure.



Reported By: Christine Thompson 

Electronically Signed:  2/14/2021 12:10 PM

## 2021-02-15 LAB
ANION GAP SERPL CALC-SCNC: 13 MMOL/L (ref 10–20)
ANISOCYTOSIS BLD QL SMEAR: (no result) (100X)
BASOPHILS # BLD AUTO: 0.1 THOU/UL (ref 0–0.2)
BASOPHILS NFR BLD AUTO: 0.9 % (ref 0–1)
BUN SERPL-MCNC: 24 MG/DL (ref 8.4–25.7)
CALCIUM SERPL-MCNC: 8.5 MG/DL (ref 7.8–10.44)
CHLORIDE SERPL-SCNC: 104 MMOL/L (ref 98–107)
CO2 SERPL-SCNC: 27 MMOL/L (ref 23–31)
CREAT CL PREDICTED SERPL C-G-VRATE: 127 ML/MIN (ref 70–130)
EOSINOPHIL # BLD AUTO: 0.1 THOU/UL (ref 0–0.7)
EOSINOPHIL NFR BLD AUTO: 1.6 % (ref 0–10)
GLUCOSE SERPL-MCNC: 78 MG/DL (ref 83–110)
HGB BLD-MCNC: 9.5 G/DL (ref 14–18)
LYMPHOCYTES # BLD: 2 THOU/UL (ref 1.2–3.4)
LYMPHOCYTES NFR BLD AUTO: 36.7 % (ref 21–51)
MAGNESIUM SERPL-MCNC: 1.9 MG/DL (ref 1.6–2.6)
MCH RBC QN AUTO: 21.9 PG (ref 27–31)
MCV RBC AUTO: 76.6 FL (ref 78–98)
MDIFF COMPLETE?: YES
MICROCYTES BLD QL SMEAR: (no result) (100X)
MONOCYTES # BLD AUTO: 0.7 THOU/UL (ref 0.11–0.59)
MONOCYTES NFR BLD AUTO: 11.8 % (ref 0–10)
NEUTROPHILS # BLD AUTO: 2.7 THOU/UL (ref 1.4–6.5)
NEUTROPHILS NFR BLD AUTO: 48.9 % (ref 42–75)
OVALOCYTES BLD QL SMEAR: (no result) (100X)
PLATELET # BLD AUTO: 132 THOU/UL (ref 130–400)
POLYCHROMASIA BLD QL SMEAR: (no result) (100X)
POTASSIUM SERPL-SCNC: 3.5 MMOL/L (ref 3.5–5.1)
RBC # BLD AUTO: 4.34 MILL/UL (ref 4.7–6.1)
SCHISTOCYTES BLD QL SMEAR: (no result) (100X)
SODIUM SERPL-SCNC: 140 MMOL/L (ref 136–145)
TARGETS BLD QL SMEAR: (no result) (100X)
WBC # BLD AUTO: 5.5 THOU/UL (ref 4.8–10.8)

## 2021-02-15 NOTE — PDOC.HOSPP
- Subjective


Encounter Date: 02/15/21


Subjective: 


Patient is alert and awake.  Comfortable and pleased with dramatic improvement. 

He is on room air and in no acute distress.  Reports significant improvement of 

scrotal edema.





- Objective


Vital Signs & Weight: 


                                     Weight











Weight                         239 lb 8 oz














I&O: 


                                        











 02/14/21 02/15/21 02/16/21





 06:59 06:59 06:59


 


Intake Total  240 


 


Output Total  300 


 


Balance  -60 











Result Diagrams: 


                                 02/14/21 12:03





                                 02/14/21 12:03


Additional Labs: 


                                   Accuchecks











  02/15/21 02/14/21 02/14/21





  05:54 20:14 16:34


 


POC Glucose  94  242 H  152 H














Hospitalist ROS





- Medication


Medications: 


Active Medications











Generic Name Dose Route Start Last Admin





  Trade Name Freq  PRN Reason Stop Dose Admin


 


Carvedilol  3.125 mg  02/14/21 21:00  02/14/21 21:19





  Carvedilol 3.125 Mg Tab  PO   3.125 mg





  BID CHON   Administration


 


Famotidine  20 mg  02/14/21 21:00  02/14/21 21:19





  Famotidine 20 Mg Tab  PO   20 mg





  BID CHON   Administration


 


Rosuvastatin Calcium  10 mg  02/14/21 21:00  02/14/21 21:19





  Rosuvastatin 10 Mg Tab  PO   10 mg





  QPM CHON   Administration


 


Sacubitril/Valsartan  1 tab  02/14/21 21:00  02/14/21 21:19





  Sacubitril 24mg/Valsartan 26mg Tab  PO   1 tab





  BID CHON   Administration














Hospitalist Exam


Vitals: 


                                     Weight











Weight                         239 lb 8 oz














General Appearance: NAD


Eye: anicteric sclera


ENT: normocephalic atraumatic


Neck: supple


Heart: RRR, murmur present


Respiratory: CTAB, no wheezes, no rales, no ronchi


Extremities: 1+ LE edema


Extremities - other findings: Bilateral LE edema, R>L.  Scrotal edema


Musculoskeletal: normal tone, normal strength


Psychiatric: normal affect, normal behavior





Hosp A/P


(1) Acute on chronic systolic (congestive) heart failure


Code(s): I50.23 - ACUTE ON CHRONIC SYSTOLIC (CONGESTIVE) HEART FAILURE   Status:

Acute   





(2) Diabetes mellitus type 2 in nonobese


Code(s): E11.9 - TYPE 2 DIABETES MELLITUS WITHOUT COMPLICATIONS   Status: Acute 

 





(3) PVD (peripheral vascular disease)


Code(s): I73.9 - PERIPHERAL VASCULAR DISEASE, UNSPECIFIED   Status: Acute   





(4) Coronary artery disease


Code(s): I25.10 - ATHSCL HEART DISEASE OF NATIVE CORONARY ARTERY W/O ANG PCTRS  

Status: Chronic   


Qualifiers: 


   Coronary Disease-Associated Artery/Lesion type: bypass graft   Native vs. 

transplanted heart: native heart   Associated angina: without angina   Qualified

Code(s): I25.810 - Atherosclerosis of coronary artery bypass graft(s) without 

angina pectoris   





(5) Dyslipidemia


Code(s): E78.5 - HYPERLIPIDEMIA, UNSPECIFIED   Status: Chronic   





(6) Hypertension


Code(s): I10 - ESSENTIAL (PRIMARY) HYPERTENSION   Status: Chronic   


Qualifiers: 


   Hypertension type: essential hypertension   Qualified Code(s): I10 - 

Essential (primary) hypertension   





- Plan





Assessment 


Patient is a 75-year-old  male with a known past medical history of 

aortic stenosis status post TAVR in 2018, advanced systolic CHF with EF of 15%. 

He presented to the hospital with progressively worsening extremity and scrotal 

edema.  Symptoms have been complicated by functional decline as evidenced by 

dyspnea on minimal exertion.  Is doing well on diuresis





Acute on chronic systolic CHF


Anasarca


Type 2 diabetes mellitus


Hypertension


Hyperlipidemia


Peripheral vascular disease





Plan:


Continue diuresis at current dose


Continue Coreg and Entresto


Monitor ins and out daily


Trend BMP daily


Fluid restriction with no more than 1.5 L/day


Scrotal elevation


Right lower extremity venous Doppler to rule out DVT


Continue home dose of Plavix and rosuvastatin for peripheral vascular disease


Continue insulin sliding scale.  Follow-up hemoglobin A1c


Lovenox and famotidine for DVT and GI prophylaxis, respectively

## 2021-02-15 NOTE — ULT
RIGHT LOWER EXTREMITY VENOUS DUPLEX STUDY:

 

INDICATIONS:

Right lower extremity pain and edema.

 

TECHNIQUE:

The deep veins of the right lower extremity are evaluated with ultrasound and Doppler using color Dop
pler, spectral analysis and compression.

 

FINDINGS:

The deep veins of the right lower extremity exhibit normal blood flow and compression. No evidence of
 DVT identified.

 

IMPRESSION:

No evidence of right lower extremity deep venous thrombosis.

 

POS: AGW

## 2021-02-16 VITALS — TEMPERATURE: 97.4 F | DIASTOLIC BLOOD PRESSURE: 64 MMHG | SYSTOLIC BLOOD PRESSURE: 116 MMHG

## 2021-02-16 NOTE — PDOC.DS.DS
Provider


Date of Admission: 


02/14/21 14:31





Date of Discharge: 02/16/21


Admitting Provider: 


Bree Nesbitt MD





Primary Care Physician: 


Soraya Rose MD








Course


Hospital Course: 


Patient is a 75-year-old  male with a known past medical history of 

coronary artery disease status post CABG, aortic stenosis status post TAVR in 

2018, and advanced systolic CHF with EF of 15% .  He presented to the hospital 

with progressively worsening extremity and scrotal edema, which improved 

drastically with diuresis.  Patient was never in respiratory failure nor did he 

require supplemental oxygen.  He is near euvolemic status at this time and he 

can be discharged continue on his heart failure regimen.  I will doubled his 

home regimen of furosemide, which will not be 40 mg oral daily.  His hospital 

course has been unremarkable








Lab Results: 


                                        





                                 02/15/21 03:55 





                                 02/15/21 03:55 





Abnormal Lab Results - Last 48 hrs





02/14/21 18:49: Troponin I 0.030 H


02/15/21 03:55: RBC 4.34 L, Hgb 9.5 L, Hct 33.2 L, MCV 76.6 L, MCH 21.9 L, MCHC 

28.6 L, RDW 18.8 H, MPV 10.9 H, Monocytes % 11.8 H, Monocytes # 0.7 H, 

Hypochromia MODERATE=16-30 cells H, Target Cells MODERATE= 6-15 cells H


02/15/21 17:48: B-Natriuretic Peptide 3226.6 H


02/15/21 17:48: Hemoglobin A1c 6.3 H


02/16/21 02:59: B-Natriuretic Peptide 3870.3 H





Microbiology - Entire Visit





02/14/21 13:13   Venous blood - Left Arm   Blood Culture - Preliminary


                            NO GROWTH AT 48 HOURS


02/14/21 12:55   Urine voided   Urine Culture - Final


                            NO GROWTH AT 48 HOURS


02/14/21 12:53   Venous blood - Right Arm   Blood Culture - Preliminary


                            Gram Positive Srinath








Vitals: 


                             Vital Signs (12 hours)











  Temp Pulse Resp BP Pulse Ox


 


 02/16/21 08:00  97.9 F  89  18  115/58 L  94 L


 


 02/16/21 03:43  98.0 F  88  18  107/61  95








                                     Weight











Weight                         239 lb 11.2 oz














Physical Exam: 


The patient was seen and examined on the day of discharge.





General Appearance: NAD, awake alert


Eye: anicteric sclera


ENT: normocephalic atraumatic


Respiratory: CTAB, no wheezes, no rales, no ronchi


Cardiovascular: RRR, no murmur, no gallops, no rubs


Gastrointestinal: soft, non-tender, non-distended


Extremities - other findings: Minimal scrotal edema.  Nearly resolved lower 

extremity edema


Neurological: cranial nerve grossly intact


PSYCH: normal affect, normal behavior





Problem


Assessment: 


Please refer to hospital course


(1) Acute on chronic systolic (congestive) heart failure


Code(s): I50.23 - ACUTE ON CHRONIC SYSTOLIC (CONGESTIVE) HEART FAILURE   Status:

Acute   





(2) Diabetes mellitus type 2 in nonobese


Code(s): E11.9 - TYPE 2 DIABETES MELLITUS WITHOUT COMPLICATIONS   Status: Acute 

 





(3) PVD (peripheral vascular disease)


Code(s): I73.9 - PERIPHERAL VASCULAR DISEASE, UNSPECIFIED   Status: Acute   





(4) Coronary artery disease


Code(s): I25.10 - ATHSCL HEART DISEASE OF NATIVE CORONARY ARTERY W/O ANG PCTRS  

Status: Chronic   


Qualifiers: 


   Coronary Disease-Associated Artery/Lesion type: bypass graft   Native vs. 

transplanted heart: native heart   Associated angina: without angina   Qualified

Code(s): I25.810 - Atherosclerosis of coronary artery bypass graft(s) without 

angina pectoris   





(5) Dyslipidemia


Code(s): E78.5 - HYPERLIPIDEMIA, UNSPECIFIED   Status: Chronic   





(6) Hypertension


Code(s): I10 - ESSENTIAL (PRIMARY) HYPERTENSION   Status: Chronic   


Qualifiers: 


   Hypertension type: essential hypertension   Qualified Code(s): I10 - 

Essential (primary) hypertension   





Plan


Home Medications: 


                                        











 Medication  Instructions  Recorded  Confirmed  Type


 


Gabapentin 600 mg PO ASDIR 08/11/17 02/14/21 History


 


Pramipexole Di-HCl [Pramipexole 0.25 mg PO BID 08/11/17 02/14/21 History





Dihydrochloride]    


 


Rosuvastatin Calcium 10 mg PO QPM 08/11/17 02/14/21 History


 


Clopidogrel Bisulfate [Plavix] 75 mg PO DAILY 08/14/17 02/14/21 History


 


Gabapentin 1,200 mg PO HS 01/03/18 02/14/21 History


 


Finasteride 5 mg PO DAILY 07/25/20 02/14/21 History


 


Sacubitril/Valsartan [Entresto 24 1 tab PO BID 07/25/20 02/14/21 History





mg-26 mg Tablet]    


 


Aspirin Chewable [Aspirin Chewable 81 mg PO DAILY 10/05/20 02/14/21 History





Tablet]    


 


Carvedilol [Coreg] 3.125 mg PO BID 10/05/20 02/14/21 History


 


Empagliflozin [Jardiance] 10 mg PO DAILY 10/05/20 02/14/21 History


 


Diclofenac Sodium 75 mg PO BID 01/31/21 02/14/21 History


 


Glimepiride [Amaryl] 4 mg PO QAM-WM 01/31/21 02/14/21 History


 


Furosemide 40 mg PO DAILY #0 02/16/21 02/14/21 Rx











Allergies: 








No Known Allergies Allergy (Verified 02/14/21 16:59)


   





Referrals: 


Soraya Rose MD [Primary Care Provider] - 


Disposition: HOME





Quality


CORE MEASURES:: N/A

## 2025-07-22 NOTE — CON
DATE OF CONSULTATION:  



HISTORY OF PRESENT ILLNESS:  This is a 75-year-old gentleman who relates a 1-week

history of an ulcer on the left great toe and heel.  On closer questioning, these

are probably been there longer.  He suffered a brown recluse bite on his left calf

several weeks ago and had some difficulty with this.  In any event, he presented to

the emergency room with the above complaints and was admitted to the hospital.  He

states he does have claudication, but says it is not very bad and states he can walk

a block with some left calf discomfort.  After his antibiotics for his brown recluse

bite, the patient has not completely healed up his wounds and was therefore

admitted.  He has previously been evaluated for peripheral vascular disease,

undergoing angiography about three years ago.  At that time, he had bilateral

superficial femoral artery occlusions as well as a significant stenosis in his

distal left common iliac artery.  Intervention was not taken at that time due to the

patient stating his claudication was not that bothersome, and in fact at that time,

his right calf was the more bothersome leg and it is the left calf.  He does have a

history of a coronary bypass grafting x5 in 2002 with two graft remaining patent.

He has had stenting on 2 occasions to a very small diagonal branch and I do not know

what the other branch is.  He has also had a TAVR done in 2018 for aortic valve

stenosis.  Otherwise, past medical history includes carotid artery disease, diabetes

mellitus, hypertension, dyslipidemia, peripheral vascular disease, coronary artery

disease. 



PAST SURGICAL HISTORY:  The above-noted TAVR in 2018.  He has also had a left

carotid endarterectomy in 2008, coronary bypass grafting in 2002, OM graft stent in

2009, diagonal stent in 2017, cholecystectomy, right salivary gland tumor removal. 



SOCIAL HISTORY:  The patient is recently  this summer with his wife passing

away from COVID virus.  His son helps take care of him and he still is in the

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 



ALLERGIES:  HE HAS NO KNOWN ALLERGIES.



CURRENT MEDICATIONS:  

1. Pramipexole 0.25 b.i.d.

2. Gabapentin 600 daily, 1200 nightly.

3. Proscar 5 a day.

4. Entresto 24/26 b.i.d.

5. Glimepiride 2 mg b.i.d.

6. Lasix 20 a day.

7. Plavix 75 a day.

8. Crestor 10 every evening.



PHYSICAL EXAMINATION:

GENERAL:  He is alert, cooperative gentleman, in no distress.  He has a recorded

height of 6 feet 3 inches, weight of 213, blood pressure 125/68, heart rate 70. 

NECK:  No carotid bruits. 

LUNGS:  Clear to auscultation. 

CARDIAC:  Regular rate and rhythm with a 2/6 systolic murmur right upper and left

lower sternal border. 

ABDOMEN:  Soft and nontender.  No organomegaly. 

EXTREMITIES:  He has palpable femoral pulses bilaterally with a monophasic left

dorsalis pedis and biphasic right posterior tibial.  He has some rubor in his left

lower extremity with dry eschar over the plantar surface of his left great toe and a

crack in his left heel.  He also has a slight area of black discoloration near his

right great toenail.  He has no peripheral edema. 



ASSESSMENT AND PLAN:  At this time, the patient probably needs a left common femoral

endarterectomy and a femoral popliteal bypass, but we will need to repeat his CT

angiogram to update his vascular status since the last study was about three years

ago.  We will use a bioprosthetic graft due to the fact that his saphenous vein has

been harvested from his left lower extremity.  We will hold his Plavix for now and

check his CT angiogram tomorrow. 







Job ID:  [id number]
Universal Safety Interventions